# Patient Record
Sex: MALE | Race: WHITE | Employment: OTHER | URBAN - METROPOLITAN AREA
[De-identification: names, ages, dates, MRNs, and addresses within clinical notes are randomized per-mention and may not be internally consistent; named-entity substitution may affect disease eponyms.]

---

## 2022-09-08 LAB
AVERAGE GLUCOSE: ABNORMAL
HBA1C MFR BLD: 6.7 %

## 2022-10-24 NOTE — PROGRESS NOTES
New Patient Abstract    Reason for Referral: Elevated white blood cell count, unspecified    Referring Provider:  TRACEY Morley NP    Primary Care Provider: TRACEY Morley NP    Family History of Cancer/Hematologic Disorders: none noted     Presenting Symptoms: none noted except abnormal CBC    Narrative with recent with Results/Procedures/Biopsies and Dates completed:   Mr. Marlyn Slaughter is a 71-year-old male who reports to be a former smoker for 18 years that quit in 1992. His alcohol use and drug use are unknown. His medical history reports as ANDREW, HTN, GERD, HLD, insomnia, vertigo, CKD, diabetic, diabetic peripheral neuropathy, CAD and nephritis. His surgical history reports as colonoscopy, tonsillectomy, carpal tunnel release, multiple cardiac stent placements and appendectomy. In September 2022, Mr. Luis Glover presented to PCP for follow up on chronic conditions. He reported feeling fine- 'not sick' however his WBC was elevated. He denied fever, chills, dysuria, abdominal pain, nausea or recent infection. He was placed on a Z-pack and to return for repeat CBC and peripheral smear. A referral was place to hematology to further evaluate his leukocytosis. His 9/30/11 CBC reported an elevated WBC of 12.78 with ANC of 9.10 and # eosinophils of 0.69. otherwise WNL. His 9/27/22 CBC reported an elevated WBC of 12.25 and # eosinophil of 0.66 otherwise WNL. His 9/27/22 pathology review of smear reported RBCs as normochromic normocytic; platelets as adequate in morphology and number and WBCs as slight leukocytosis, eosinophilia. His 9/14/22 CBC reported an elevated WBC of 14.54, # eosinophils of 0.69 and ANC of 10.51. His 9/14/22 pathologist review of smear reported mild neutrophilia, consistent with a reactive condition and mild eosinophilia. His 9/8/22 CBC reported an elevated WBC of 13.73, ANC of 9.33 and # eosinophils of 0.64.  His 9/8/22 chemistry panel reported an elevated BUN of 33 and creatinine of 2.4.   Labs      9/14/22 9/8/22      Notes from Referring Provider: n/a    Other Pertinent Information: n/a    Presented at Tumor Board: n/a

## 2022-10-26 ENCOUNTER — OFFICE VISIT (OUTPATIENT)
Dept: ONCOLOGY | Age: 67
End: 2022-10-26
Payer: MEDICARE

## 2022-10-26 ENCOUNTER — HOSPITAL ENCOUNTER (OUTPATIENT)
Dept: LAB | Age: 67
Discharge: HOME | End: 2022-10-29
Payer: MEDICARE

## 2022-10-26 VITALS
BODY MASS INDEX: 34.3 KG/M2 | OXYGEN SATURATION: 96 % | HEART RATE: 69 BPM | RESPIRATION RATE: 18 BRPM | DIASTOLIC BLOOD PRESSURE: 85 MMHG | SYSTOLIC BLOOD PRESSURE: 149 MMHG | HEIGHT: 70 IN | TEMPERATURE: 98.1 F | WEIGHT: 239.6 LBS

## 2022-10-26 DIAGNOSIS — D72.829 LEUKOCYTOSIS, UNSPECIFIED TYPE: ICD-10-CM

## 2022-10-26 DIAGNOSIS — D47.1 MPN (MYELOPROLIFERATIVE NEOPLASM) (HCC): ICD-10-CM

## 2022-10-26 DIAGNOSIS — D47.1 MPN (MYELOPROLIFERATIVE NEOPLASM) (HCC): Primary | ICD-10-CM

## 2022-10-26 LAB
25(OH)D3 SERPL-MCNC: 54.5 NG/ML (ref 30–100)
ALBUMIN SERPL-MCNC: 3.4 G/DL (ref 3.2–4.6)
ALBUMIN/GLOB SERPL: 0.8 {RATIO} (ref 0.4–1.6)
ALP SERPL-CCNC: 106 U/L (ref 50–136)
ALT SERPL-CCNC: 61 U/L (ref 12–65)
ANION GAP SERPL CALC-SCNC: 7 MMOL/L (ref 2–11)
AST SERPL-CCNC: 31 U/L (ref 15–37)
BASOPHILS # BLD: 0.1 K/UL (ref 0–0.2)
BASOPHILS NFR BLD: 1 % (ref 0–2)
BILIRUB SERPL-MCNC: 0.3 MG/DL (ref 0.2–1.1)
BUN SERPL-MCNC: 41 MG/DL (ref 8–23)
CALCIUM SERPL-MCNC: 9.2 MG/DL (ref 8.3–10.4)
CHLORIDE SERPL-SCNC: 105 MMOL/L (ref 101–110)
CO2 SERPL-SCNC: 23 MMOL/L (ref 21–32)
CREAT SERPL-MCNC: 2.1 MG/DL (ref 0.8–1.5)
CRP SERPL-MCNC: 0.7 MG/DL (ref 0–0.9)
DIFFERENTIAL METHOD BLD: NORMAL
EOSINOPHIL # BLD: 0.5 K/UL (ref 0–0.8)
EOSINOPHIL NFR BLD: 5 % (ref 0.5–7.8)
ERYTHROCYTE [DISTWIDTH] IN BLOOD BY AUTOMATED COUNT: 14.6 % (ref 11.9–14.6)
ERYTHROCYTE [SEDIMENTATION RATE] IN BLOOD: 30 MM/HR (ref 0–20)
GLOBULIN SER CALC-MCNC: 4.1 G/DL (ref 2.8–4.5)
GLUCOSE SERPL-MCNC: 339 MG/DL (ref 65–100)
HCT VFR BLD AUTO: 42.3 %
HGB BLD-MCNC: 14.4 G/DL (ref 13.6–17.2)
IMM GRANULOCYTES # BLD AUTO: 0 K/UL (ref 0–0.5)
IMM GRANULOCYTES NFR BLD AUTO: 0 % (ref 0–5)
LYMPHOCYTES # BLD: 2.2 K/UL (ref 0.5–4.6)
LYMPHOCYTES NFR BLD: 21 % (ref 13–44)
Lab: NORMAL
MCH RBC QN AUTO: 28.9 PG (ref 26.1–32.9)
MCHC RBC AUTO-ENTMCNC: 34 G/DL (ref 31.4–35)
MCV RBC AUTO: 84.9 FL (ref 82–102)
MONOCYTES # BLD: 1 K/UL (ref 0.1–1.3)
MONOCYTES NFR BLD: 10 % (ref 4–12)
NEUTS SEG # BLD: 6.8 K/UL (ref 1.7–8.2)
NEUTS SEG NFR BLD: 63 % (ref 43–78)
NRBC # BLD: 0 K/UL (ref 0–0.2)
PLATELET # BLD AUTO: 207 K/UL (ref 150–450)
PMV BLD AUTO: 9.5 FL (ref 9.4–12.3)
POTASSIUM SERPL-SCNC: 4.4 MMOL/L (ref 3.5–5.1)
PROT SERPL-MCNC: 7.5 G/DL (ref 6.3–8.2)
RBC # BLD AUTO: 4.98 M/UL (ref 4.23–5.6)
REFERENCE LAB: NORMAL
REFERENCE LAB: NORMAL
SODIUM SERPL-SCNC: 135 MMOL/L (ref 133–143)
VIT B12 SERPL-MCNC: 1556 PG/ML (ref 193–986)
WBC # BLD AUTO: 10.7 K/UL (ref 4.3–11.1)

## 2022-10-26 PROCEDURE — 82784 ASSAY IGA/IGD/IGG/IGM EACH: CPT

## 2022-10-26 PROCEDURE — 85652 RBC SED RATE AUTOMATED: CPT

## 2022-10-26 PROCEDURE — 4004F PT TOBACCO SCREEN RCVD TLK: CPT | Performed by: INTERNAL MEDICINE

## 2022-10-26 PROCEDURE — 3017F COLORECTAL CA SCREEN DOC REV: CPT | Performed by: INTERNAL MEDICINE

## 2022-10-26 PROCEDURE — G8427 DOCREV CUR MEDS BY ELIG CLIN: HCPCS | Performed by: INTERNAL MEDICINE

## 2022-10-26 PROCEDURE — 36415 COLL VENOUS BLD VENIPUNCTURE: CPT

## 2022-10-26 PROCEDURE — 1123F ACP DISCUSS/DSCN MKR DOCD: CPT | Performed by: INTERNAL MEDICINE

## 2022-10-26 PROCEDURE — 85025 COMPLETE CBC W/AUTO DIFF WBC: CPT

## 2022-10-26 PROCEDURE — 82306 VITAMIN D 25 HYDROXY: CPT

## 2022-10-26 PROCEDURE — 99205 OFFICE O/P NEW HI 60 MIN: CPT | Performed by: INTERNAL MEDICINE

## 2022-10-26 PROCEDURE — 86140 C-REACTIVE PROTEIN: CPT

## 2022-10-26 PROCEDURE — G8484 FLU IMMUNIZE NO ADMIN: HCPCS | Performed by: INTERNAL MEDICINE

## 2022-10-26 PROCEDURE — G8417 CALC BMI ABV UP PARAM F/U: HCPCS | Performed by: INTERNAL MEDICINE

## 2022-10-26 PROCEDURE — 82607 VITAMIN B-12: CPT

## 2022-10-26 RX ORDER — OMEPRAZOLE 40 MG/1
CAPSULE, DELAYED RELEASE ORAL
COMMUNITY
Start: 2022-08-13

## 2022-10-26 RX ORDER — ADHESIVE TAPE 3"X 2.3 YD
400 TAPE, NON-MEDICATED TOPICAL
COMMUNITY

## 2022-10-26 RX ORDER — EMPAGLIFLOZIN 10 MG/1
TABLET, FILM COATED ORAL
COMMUNITY
Start: 2022-08-13

## 2022-10-26 RX ORDER — GABAPENTIN 600 MG/1
600 TABLET ORAL 3 TIMES DAILY
COMMUNITY

## 2022-10-26 RX ORDER — CARVEDILOL 3.12 MG/1
TABLET ORAL
Status: ON HOLD | COMMUNITY
Start: 2022-08-16 | End: 2022-11-19 | Stop reason: HOSPADM

## 2022-10-26 RX ORDER — LISINOPRIL 2.5 MG/1
TABLET ORAL
Status: ON HOLD | COMMUNITY
Start: 2022-07-24 | End: 2022-11-19 | Stop reason: HOSPADM

## 2022-10-26 RX ORDER — TRAZODONE HYDROCHLORIDE 100 MG/1
TABLET ORAL
COMMUNITY
Start: 2022-09-26

## 2022-10-26 RX ORDER — ATORVASTATIN CALCIUM 40 MG/1
TABLET, FILM COATED ORAL
COMMUNITY
Start: 2022-10-03

## 2022-10-26 RX ORDER — CHLORAL HYDRATE 500 MG
1000 CAPSULE ORAL
COMMUNITY

## 2022-10-26 RX ORDER — LANOLIN ALCOHOL/MO/W.PET/CERES
10 CREAM (GRAM) TOPICAL DAILY
COMMUNITY

## 2022-10-26 RX ORDER — ASPIRIN 81 MG/1
81 TABLET ORAL DAILY
COMMUNITY

## 2022-10-26 RX ORDER — NITROGLYCERIN 0.1MG/HR
1 PATCH, TRANSDERMAL 24 HOURS TRANSDERMAL AS NEEDED
Status: ON HOLD | COMMUNITY
End: 2022-11-19 | Stop reason: HOSPADM

## 2022-10-26 RX ORDER — SERTRALINE HYDROCHLORIDE 100 MG/1
100 TABLET, FILM COATED ORAL DAILY
COMMUNITY
Start: 2022-08-16

## 2022-10-26 RX ORDER — UBIDECARENONE 75 MG
200 CAPSULE ORAL DAILY
COMMUNITY

## 2022-10-26 RX ORDER — CLOPIDOGREL BISULFATE 75 MG/1
TABLET ORAL
COMMUNITY
Start: 2022-08-16

## 2022-10-26 NOTE — PATIENT INSTRUCTIONS
Patient Instructions from Today's Visit    Reason for Visit:  New Patient    Diagnosis Information:  [email or web address] net/about-us/asco-answers-patient-education-materials/euzf-iyekzrk-deqf-sheets      Plan: You will have some blood work done today    Follow Up: We will bring you back in November for a follow up with  and lab work prior. Recent Lab Results:  Lab work done today downstairs    Treatment Summary has been discussed and given to patient: n/a        -------------------------------------------------------------------------------------------------------------------  Please call our office at [phone] if you have any  of the following symptoms:   Fever of 100.5 or greater  Chills  Shortness of breath  Swelling or pain in one leg    After office hours an answering service is available and will contact a provider for emergencies or if you are experiencing any of the above symptoms. Patient did not express an interest in My Chart. My Chart log in information explained on the after visit summary printout at the [facility] desk.     Diandra Apodaca MA

## 2022-10-26 NOTE — PROGRESS NOTES
[address]  Phone: [phone]        10/26/2022  Sage Carlson  1955  [id number]      Sage Carlson is a 78year old  man who has been sent to my clinic by Sindy Natarajan NP for evaluation of Leukocytosis. ALLERGIES:     Sulfa drugs. FAMILY HISTORY:    No hematologic disorders. SOCIAL HISTORY:    He is  and lives with his wife. He used to work as . He stopped smoking cigarettes in 5/1992. PAST MEDICAL HISTORY:    Hypertension, Obstructive Sleep Apnea, GERD, Hyperlipidemia, Diabetes Mellitus, Coronary Artery Disease, s/p PTCA, renal insufficiency and Neuropathy. ROS:  The patient complained of fatigue and parasthesia; all other systems negative. PHYSICAL EXAM:   The patient was alert, awake and oriented, no acute distress was noted. Oral examination did not reveal any mucosal lesions. Lymph node examination did not reveal any adenopathy. Neck examination revealed a supple neck, no thyromegaly or masses were noted. Chest examination revealed normal vesicular breath sounds. Heart examination revealed S-1 and S-2 without any murmurs. Abdominal examination revealed a non-tender abdomen, bowel sounds were positive, no organomegaly could be appreciated. Examination of the extremities did not reveal any tenderness or erythema. Examination of the skin did not reveal any lesions. KPS:     90. LABORATORY INVESTIGATIONS:  CBC showed a WBC count of 10.7, ANC was 6.8, Hemoglobin was 14.4 and Platelets were 581; his ESR was elevated. Medical problems and test results were reviewed with the patient today. ASSESSMENT:    MPN; Leukocytosis. I spent a total of 62 minutes on the day of the visit, managing the care of this patient.       PLAN:     I have sent blood work for an extensive work-up including FISH for bcr/abl and a Myeloid Disorders Profile; at his next clinic visit I will re-check his CBC and CMP. Thank you for allowing me to participate in the care of this patient.       Jorge A Colon MD  Hematology/BMT

## 2022-10-28 LAB
ALBUMIN SERPL ELPH-MCNC: 3.7 G/DL (ref 2.9–4.4)
ALBUMIN/GLOB SERPL: 1.2 {RATIO} (ref 0.7–1.7)
ALPHA1 GLOB SERPL ELPH-MCNC: 0.2 G/DL (ref 0–0.4)
ALPHA2 GLOB SERPL ELPH-MCNC: 0.9 G/DL (ref 0.4–1)
B-GLOBULIN SERPL ELPH-MCNC: 1 G/DL (ref 0.7–1.3)
GAMMA GLOB SERPL ELPH-MCNC: 1 G/DL (ref 0.4–1.8)
GLOBULIN SER-MCNC: 3.2 G/DL (ref 2.2–3.9)
IGA SERPL-MCNC: 338 MG/DL (ref 61–437)
IGG SERPL-MCNC: 1045 MG/DL (ref 603–1613)
IGM SERPL-MCNC: 95 MG/DL (ref 20–172)
INTERPRETATION SERPL IEP-IMP: NORMAL
M PROTEIN SERPL ELPH-MCNC: NORMAL G/DL
PROT SERPL-MCNC: 6.9 G/DL (ref 6–8.5)

## 2022-11-15 DIAGNOSIS — D47.1 MPN (MYELOPROLIFERATIVE NEOPLASM) (HCC): Primary | ICD-10-CM

## 2022-11-16 ENCOUNTER — HOSPITAL ENCOUNTER (OUTPATIENT)
Dept: LAB | Age: 67
Discharge: HOME | End: 2022-11-19
Payer: MEDICARE

## 2022-11-16 ENCOUNTER — OFFICE VISIT (OUTPATIENT)
Dept: ONCOLOGY | Age: 67
End: 2022-11-16
Payer: MEDICARE

## 2022-11-16 VITALS
DIASTOLIC BLOOD PRESSURE: 70 MMHG | HEART RATE: 65 BPM | WEIGHT: 243.9 LBS | RESPIRATION RATE: 20 BRPM | HEIGHT: 70 IN | TEMPERATURE: 97.8 F | BODY MASS INDEX: 34.92 KG/M2 | SYSTOLIC BLOOD PRESSURE: 123 MMHG | OXYGEN SATURATION: 98 %

## 2022-11-16 DIAGNOSIS — E78.49 OTHER HYPERLIPIDEMIA: ICD-10-CM

## 2022-11-16 DIAGNOSIS — K21.9 CHRONIC GERD: ICD-10-CM

## 2022-11-16 DIAGNOSIS — D72.829 LEUKOCYTOSIS, UNSPECIFIED TYPE: Primary | ICD-10-CM

## 2022-11-16 DIAGNOSIS — D47.1 MPN (MYELOPROLIFERATIVE NEOPLASM) (HCC): ICD-10-CM

## 2022-11-16 LAB
ALBUMIN SERPL-MCNC: 3.6 G/DL (ref 3.2–4.6)
ALBUMIN/GLOB SERPL: 0.9 {RATIO} (ref 0.4–1.6)
ALP SERPL-CCNC: 116 U/L (ref 50–136)
ALT SERPL-CCNC: 48 U/L (ref 12–65)
ANION GAP SERPL CALC-SCNC: 6 MMOL/L (ref 2–11)
AST SERPL-CCNC: 24 U/L (ref 15–37)
BASOPHILS # BLD: 0.2 K/UL (ref 0–0.2)
BASOPHILS NFR BLD: 2 % (ref 0–2)
BILIRUB SERPL-MCNC: 0.4 MG/DL (ref 0.2–1.1)
BUN SERPL-MCNC: 34 MG/DL (ref 8–23)
CALCIUM SERPL-MCNC: 8.9 MG/DL (ref 8.3–10.4)
CHLORIDE SERPL-SCNC: 101 MMOL/L (ref 101–110)
CO2 SERPL-SCNC: 26 MMOL/L (ref 21–32)
CREAT SERPL-MCNC: 2.4 MG/DL (ref 0.8–1.5)
DIFFERENTIAL METHOD BLD: NORMAL
EOSINOPHIL # BLD: 0.5 K/UL (ref 0–0.8)
EOSINOPHIL NFR BLD: 5 % (ref 0.5–7.8)
ERYTHROCYTE [DISTWIDTH] IN BLOOD BY AUTOMATED COUNT: 14.4 % (ref 11.9–14.6)
GLOBULIN SER CALC-MCNC: 3.8 G/DL (ref 2.8–4.5)
GLUCOSE SERPL-MCNC: 445 MG/DL (ref 65–100)
HCT VFR BLD AUTO: 43.9 %
HGB BLD-MCNC: 14.4 G/DL (ref 13.6–17.2)
IMM GRANULOCYTES # BLD AUTO: 0 K/UL (ref 0–0.5)
IMM GRANULOCYTES NFR BLD AUTO: 0 % (ref 0–5)
LYMPHOCYTES # BLD: 2.3 K/UL (ref 0.5–4.6)
LYMPHOCYTES NFR BLD: 22 % (ref 13–44)
MCH RBC QN AUTO: 28.5 PG (ref 26.1–32.9)
MCHC RBC AUTO-ENTMCNC: 32.8 G/DL (ref 31.4–35)
MCV RBC AUTO: 86.8 FL (ref 82–102)
MONOCYTES # BLD: 0.9 K/UL (ref 0.1–1.3)
MONOCYTES NFR BLD: 9 % (ref 4–12)
NEUTS SEG # BLD: 6.3 K/UL (ref 1.7–8.2)
NEUTS SEG NFR BLD: 61 % (ref 43–78)
NRBC # BLD: 0 K/UL (ref 0–0.2)
PLATELET # BLD AUTO: 202 K/UL (ref 150–450)
PMV BLD AUTO: 9.4 FL (ref 9.4–12.3)
POTASSIUM SERPL-SCNC: 4.8 MMOL/L (ref 3.5–5.1)
PROT SERPL-MCNC: 7.4 G/DL (ref 6.3–8.2)
RBC # BLD AUTO: 5.06 M/UL (ref 4.23–5.6)
SODIUM SERPL-SCNC: 133 MMOL/L (ref 133–143)
WBC # BLD AUTO: 10.3 K/UL (ref 4.3–11.1)

## 2022-11-16 PROCEDURE — 99214 OFFICE O/P EST MOD 30 MIN: CPT | Performed by: INTERNAL MEDICINE

## 2022-11-16 PROCEDURE — 1123F ACP DISCUSS/DSCN MKR DOCD: CPT | Performed by: INTERNAL MEDICINE

## 2022-11-16 PROCEDURE — G8484 FLU IMMUNIZE NO ADMIN: HCPCS | Performed by: INTERNAL MEDICINE

## 2022-11-16 PROCEDURE — 85025 COMPLETE CBC W/AUTO DIFF WBC: CPT

## 2022-11-16 PROCEDURE — 80053 COMPREHEN METABOLIC PANEL: CPT

## 2022-11-16 PROCEDURE — 36415 COLL VENOUS BLD VENIPUNCTURE: CPT

## 2022-11-16 PROCEDURE — 3017F COLORECTAL CA SCREEN DOC REV: CPT | Performed by: INTERNAL MEDICINE

## 2022-11-16 PROCEDURE — 1036F TOBACCO NON-USER: CPT | Performed by: INTERNAL MEDICINE

## 2022-11-16 PROCEDURE — G8427 DOCREV CUR MEDS BY ELIG CLIN: HCPCS | Performed by: INTERNAL MEDICINE

## 2022-11-16 PROCEDURE — G8417 CALC BMI ABV UP PARAM F/U: HCPCS | Performed by: INTERNAL MEDICINE

## 2022-11-16 ASSESSMENT — PATIENT HEALTH QUESTIONNAIRE - PHQ9
SUM OF ALL RESPONSES TO PHQ QUESTIONS 1-9: 0
SUM OF ALL RESPONSES TO PHQ QUESTIONS 1-9: 0
SUM OF ALL RESPONSES TO PHQ9 QUESTIONS 1 & 2: 0
1. LITTLE INTEREST OR PLEASURE IN DOING THINGS: 0
2. FEELING DOWN, DEPRESSED OR HOPELESS: 0
SUM OF ALL RESPONSES TO PHQ QUESTIONS 1-9: 0
SUM OF ALL RESPONSES TO PHQ QUESTIONS 1-9: 0

## 2022-11-16 NOTE — PATIENT INSTRUCTIONS
Patient Instructions from Today's Visit    Reason for Visit:      Diagnosis Information:  [email or web address] net/about-us/asco-answers-patient-education-materials/gjzr-gsywpsz-jtvy-sheets    Plan:  Lab work looks stable today  Your White Count has come back to normal    -we think this was elevated before because of inflammation in the body  We did not find any evidence of Leukemia or a bone marrow cancer    Follow Up: We would like you to continue seeing your PCP.  If you need anything in the future, please call our office    Recent Lab Results:  Hospital Outpatient Visit on 11/16/2022   Component Date Value Ref Range Status    WBC 11/16/2022 10.3  4.3 - 11.1 K/uL Final    RBC 11/16/2022 5.06  4.23 - 5.6 M/uL Final    Hemoglobin 11/16/2022 14.4  13.6 - 17.2 g/dL Final    Hematocrit 11/16/2022 43.9  % Final    MCV 11/16/2022 86.8  82.0 - 102.0 FL Final    MCH 11/16/2022 28.5  26.1 - 32.9 PG Final    MCHC 11/16/2022 32.8  31.4 - 35.0 g/dL Final    RDW 11/16/2022 14.4  11.9 - 14.6 % Final    Platelets 38/21/9729 202  150 - 450 K/uL Final    MPV 11/16/2022 9.4  9.4 - 12.3 FL Final    nRBC 11/16/2022 0.00  0.0 - 0.2 K/uL Final    **Note: Absolute NRBC parameter is now reported with Hemogram**    Differential Type 11/16/2022 AUTOMATED    Final    Seg Neutrophils 11/16/2022 61  43 - 78 % Final    Lymphocytes 11/16/2022 22  13 - 44 % Final    Monocytes 11/16/2022 9  4.0 - 12.0 % Final    Eosinophils % 11/16/2022 5  0.5 - 7.8 % Final    Basophils 11/16/2022 2  0.0 - 2.0 % Final    Immature Granulocytes 11/16/2022 0  0.0 - 5.0 % Final    Segs Absolute 11/16/2022 6.3  1.7 - 8.2 K/UL Final    Absolute Lymph # 11/16/2022 2.3  0.5 - 4.6 K/UL Final    Absolute Mono # 11/16/2022 0.9  0.1 - 1.3 K/UL Final    Absolute Eos # 11/16/2022 0.5  0.0 - 0.8 K/UL Final    Basophils Absolute 11/16/2022 0.2  0.0 - 0.2 K/UL Final    Absolute Immature Granulocyte 11/16/2022 0.0  0.0 - 0.5 K/UL Final    Sodium 11/16/2022 133  133 - 143 mmol/L Final    Potassium 11/16/2022 4.8  3.5 - 5.1 mmol/L Final    Chloride 11/16/2022 101  101 - 110 mmol/L Final    CO2 11/16/2022 26  21 - 32 mmol/L Final    Anion Gap 11/16/2022 6  2 - 11 mmol/L Final    Glucose 11/16/2022 445 (A)  65 - 100 mg/dL Final    BUN 11/16/2022 34 (A)  8 - 23 MG/DL Final    Creatinine 11/16/2022 2.40 (A)  0.8 - 1.5 MG/DL Final    Est, Glom Filt Rate 11/16/2022 29 (A)  >60 ml/min/1.73m2 Final    Comment:      Pediatric calculator link: [employer].at. org/professionals/kdoqi/gfr_calculatorped       Effective Oct 3, 2022       These results are not intended for use in patients <25years of age. eGFR results are calculated without a race factor using  the 2021 CKD-EPI equation. Careful clinical correlation is recommended, particularly when comparing to results calculated using previous equations. The CKD-EPI equation is less accurate in patients with extremes of muscle mass, extra-renal metabolism of creatinine, excessive creatine ingestion, or following therapy that affects renal tubular secretion.       Calcium 11/16/2022 8.9  8.3 - 10.4 MG/DL Final    Total Bilirubin 11/16/2022 0.4  0.2 - 1.1 MG/DL Final    ALT 11/16/2022 48  12 - 65 U/L Final    AST 11/16/2022 24  15 - 37 U/L Final    Alk Phosphatase 11/16/2022 116  50 - 136 U/L Final    Total Protein 11/16/2022 7.4  6.3 - 8.2 g/dL Final    Albumin 11/16/2022 3.6  3.2 - 4.6 g/dL Final    Globulin 11/16/2022 3.8  2.8 - 4.5 g/dL Final    Albumin/Globulin Ratio 11/16/2022 0.9  0.4 - 1.6   Final         Treatment Summary has been discussed and given to patient: n/a        -------------------------------------------------------------------------------------------------------------------  Please call our office at [phone] if you have any  of the following symptoms:   Fever of 100.5 or greater  Chills  Shortness of breath  Swelling or pain in one leg    After office hours an answering service is available and will contact a provider for emergencies or if you are experiencing any of the above symptoms. Patient has My Chart. My Chart log in information explained on the after visit summary printout at the . [facility] desk.     Diego Aleman MA

## 2022-11-16 NOTE — PROGRESS NOTES
[address]  Phone: [phone]           11/16/2022  Maria L Michel  1955  [id number]        Maria L Michel is a 78year old  man who has returned to my clinic for a follow-up visit; he was initially referred to me by Chuckie Nieves NP for evaluation of Leukocytosis; his FISH for bcr/abl and Myeloid Disorders Profile were unremarkable, his ESR was elevated. ALLERGIES:     Sulfa drugs. FAMILY HISTORY:    No hematologic disorders. SOCIAL HISTORY:    He is  and lives with his wife. He used to work as . He stopped smoking cigarettes in 5/1992. PAST MEDICAL HISTORY:    Hypertension, Obstructive Sleep Apnea, GERD, Hyperlipidemia, Diabetes Mellitus, Coronary Artery Disease, s/p PTCA, renal insufficiency and Neuropathy. ROS:  The patient complained of fatigue and parasthesia; all other systems negative. PHYSICAL EXAM:   The patient was alert, awake and oriented, no acute distress was noted. Oral examination did not reveal any mucosal lesions. Lymph node examination did not reveal any adenopathy. Neck examination revealed a supple neck, no thyromegaly or masses were noted. Chest examination revealed normal vesicular breath sounds. Heart examination revealed S-1 and S-2 without any murmurs. Abdominal examination revealed a non-tender abdomen, bowel sounds were positive, no organomegaly could be appreciated. Examination of the extremities did not reveal any tenderness or erythema. Examination of the skin did not reveal any lesions. KPS:     90. LABORATORY INVESTIGATIONS:  CBC showed a WBC count of 10.3, ANC was 6.3, Hemoglobin was 14.4 and Platelets were 122; his ESR was elevated. Medical problems and test results were reviewed with the patient today. ASSESSMENT:     Leukocytosis; chronic GERD; Hyperlipidemia.         PLAN:     He should continue taking Atorvastatin and Omeprazole; no further Hematologic intervention is required at this time.         Alin Street MD  Hematology/BMT

## 2022-11-17 ENCOUNTER — APPOINTMENT (OUTPATIENT)
Dept: GENERAL RADIOLOGY | Age: 67
End: 2022-11-17
Payer: MEDICARE

## 2022-11-17 ENCOUNTER — HOSPITAL ENCOUNTER (OUTPATIENT)
Age: 67
Setting detail: OBSERVATION
Discharge: HOME | End: 2022-11-19
Attending: EMERGENCY MEDICINE | Admitting: INTERNAL MEDICINE
Payer: MEDICARE

## 2022-11-17 DIAGNOSIS — N28.9 RENAL INSUFFICIENCY: ICD-10-CM

## 2022-11-17 DIAGNOSIS — R07.9 CHEST PAIN: ICD-10-CM

## 2022-11-17 DIAGNOSIS — I20.0 UNSTABLE ANGINA (HCC): ICD-10-CM

## 2022-11-17 DIAGNOSIS — Z09 HOSPITAL DISCHARGE FOLLOW-UP: Primary | ICD-10-CM

## 2022-11-17 PROBLEM — N18.32 STAGE 3B CHRONIC KIDNEY DISEASE (HCC): Status: ACTIVE | Noted: 2019-06-05

## 2022-11-17 PROBLEM — E11.9 TYPE II DIABETES MELLITUS, WELL CONTROLLED (HCC): Status: ACTIVE | Noted: 2019-06-05

## 2022-11-17 PROBLEM — G47.33 OBSTRUCTIVE SLEEP APNEA SYNDROME: Status: ACTIVE | Noted: 2021-07-28

## 2022-11-17 PROBLEM — I25.10 CORONARY ARTERIOSCLEROSIS: Status: ACTIVE | Noted: 2019-06-05

## 2022-11-17 PROBLEM — E11.42 DIABETIC PERIPHERAL NEUROPATHY (HCC): Status: ACTIVE | Noted: 2022-09-08

## 2022-11-17 PROBLEM — I12.9 HYPERTENSIVE RENAL DISEASE: Status: ACTIVE | Noted: 2019-06-05

## 2022-11-17 PROBLEM — K37 APPENDICITIS: Status: ACTIVE | Noted: 2021-07-28

## 2022-11-17 PROBLEM — M10.9 GOUT: Status: ACTIVE | Noted: 2019-06-05

## 2022-11-17 PROBLEM — N05.9 NEPHRITIS: Status: ACTIVE | Noted: 2022-11-10

## 2022-11-17 PROBLEM — I10 BENIGN ESSENTIAL HYPERTENSION: Status: ACTIVE | Noted: 2019-06-05

## 2022-11-17 PROBLEM — E87.5 HYPERKALEMIA: Status: ACTIVE | Noted: 2021-11-21

## 2022-11-17 PROBLEM — E78.2 MIXED HYPERLIPIDEMIA: Status: ACTIVE | Noted: 2022-03-10

## 2022-11-17 PROBLEM — K21.9 GASTROESOPHAGEAL REFLUX DISEASE WITHOUT ESOPHAGITIS: Status: ACTIVE | Noted: 2022-09-08

## 2022-11-17 PROBLEM — N25.81 SECONDARY HYPERPARATHYROIDISM (HCC): Status: ACTIVE | Noted: 2019-06-05

## 2022-11-17 PROBLEM — I73.9 PERIPHERAL VASCULAR DISEASE (HCC): Status: ACTIVE | Noted: 2019-06-05

## 2022-11-17 PROBLEM — F51.01 PRIMARY INSOMNIA: Status: ACTIVE | Noted: 2022-09-08

## 2022-11-17 LAB
ALBUMIN SERPL-MCNC: 1.9 G/DL (ref 3.2–4.6)
ALBUMIN/GLOB SERPL: 0.4 {RATIO} (ref 0.4–1.6)
ALP SERPL-CCNC: 130 U/L (ref 50–136)
ALT SERPL-CCNC: 51 U/L (ref 12–65)
ANION GAP SERPL CALC-SCNC: 6 MMOL/L (ref 2–11)
AST SERPL-CCNC: 47 U/L (ref 15–37)
BILIRUB SERPL-MCNC: 0.4 MG/DL (ref 0.2–1.1)
BUN SERPL-MCNC: 51 MG/DL (ref 8–23)
CALCIUM SERPL-MCNC: 9.5 MG/DL (ref 8.3–10.4)
CHLORIDE SERPL-SCNC: 106 MMOL/L (ref 101–110)
CO2 SERPL-SCNC: 25 MMOL/L (ref 21–32)
CREAT SERPL-MCNC: 2.2 MG/DL (ref 0.8–1.5)
EKG ATRIAL RATE: 70 BPM
EKG DIAGNOSIS: NORMAL
EKG P AXIS: 45 DEGREES
EKG P-R INTERVAL: 174 MS
EKG Q-T INTERVAL: 412 MS
EKG QRS DURATION: 148 MS
EKG QTC CALCULATION (BAZETT): 444 MS
EKG R AXIS: -46 DEGREES
EKG T AXIS: 73 DEGREES
EKG VENTRICULAR RATE: 70 BPM
ERYTHROCYTE [DISTWIDTH] IN BLOOD BY AUTOMATED COUNT: 13.9 % (ref 11.9–14.6)
GLOBULIN SER CALC-MCNC: 5.2 G/DL (ref 2.8–4.5)
GLUCOSE SERPL-MCNC: 304 MG/DL (ref 65–100)
HCT VFR BLD AUTO: 44.2 % (ref 41.1–50.3)
HGB BLD-MCNC: 14.6 G/DL (ref 13.6–17.2)
LIPASE SERPL-CCNC: 224 U/L (ref 73–393)
MAGNESIUM SERPL-MCNC: 2.3 MG/DL (ref 1.8–2.4)
MCH RBC QN AUTO: 28.6 PG (ref 26.1–32.9)
MCHC RBC AUTO-ENTMCNC: 33 G/DL (ref 31.4–35)
MCV RBC AUTO: 86.7 FL (ref 82–102)
NRBC # BLD: 0 K/UL (ref 0–0.2)
PLATELET # BLD AUTO: 211 K/UL (ref 150–450)
PMV BLD AUTO: 9.6 FL (ref 9.4–12.3)
POTASSIUM SERPL-SCNC: 5.7 MMOL/L (ref 3.5–5.1)
PROT SERPL-MCNC: 7.1 G/DL (ref 6.3–8.2)
RBC # BLD AUTO: 5.1 M/UL (ref 4.23–5.6)
SODIUM SERPL-SCNC: 137 MMOL/L (ref 133–143)
TROPONIN I SERPL HS-MCNC: 8.5 PG/ML (ref 0–14)
TROPONIN I SERPL HS-MCNC: 9 PG/ML (ref 0–14)
WBC # BLD AUTO: 10.7 K/UL (ref 4.3–11.1)

## 2022-11-17 PROCEDURE — 71045 X-RAY EXAM CHEST 1 VIEW: CPT

## 2022-11-17 PROCEDURE — 6370000000 HC RX 637 (ALT 250 FOR IP): Performed by: NURSE PRACTITIONER

## 2022-11-17 PROCEDURE — 99220 PR INITIAL OBSERVATION CARE/DAY 70 MINUTES: CPT | Performed by: INTERNAL MEDICINE

## 2022-11-17 PROCEDURE — G0378 HOSPITAL OBSERVATION PER HR: HCPCS

## 2022-11-17 PROCEDURE — 83690 ASSAY OF LIPASE: CPT

## 2022-11-17 PROCEDURE — 85027 COMPLETE CBC AUTOMATED: CPT

## 2022-11-17 PROCEDURE — 51702 INSERT TEMP BLADDER CATH: CPT

## 2022-11-17 PROCEDURE — 83735 ASSAY OF MAGNESIUM: CPT

## 2022-11-17 PROCEDURE — 94761 N-INVAS EAR/PLS OXIMETRY MLT: CPT

## 2022-11-17 PROCEDURE — 93005 ELECTROCARDIOGRAM TRACING: CPT | Performed by: EMERGENCY MEDICINE

## 2022-11-17 PROCEDURE — 80053 COMPREHEN METABOLIC PANEL: CPT

## 2022-11-17 PROCEDURE — 99285 EMERGENCY DEPT VISIT HI MDM: CPT

## 2022-11-17 PROCEDURE — 84484 ASSAY OF TROPONIN QUANT: CPT

## 2022-11-17 RX ORDER — ADHESIVE TAPE 3"X 2.3 YD
400 TAPE, NON-MEDICATED TOPICAL DAILY
Status: DISCONTINUED | OUTPATIENT
Start: 2022-11-17 | End: 2022-11-17 | Stop reason: SDUPTHER

## 2022-11-17 RX ORDER — PANTOPRAZOLE SODIUM 40 MG/1
40 TABLET, DELAYED RELEASE ORAL
Status: DISCONTINUED | OUTPATIENT
Start: 2022-11-18 | End: 2022-11-19 | Stop reason: HOSPADM

## 2022-11-17 RX ORDER — ONDANSETRON 4 MG/1
4 TABLET, ORALLY DISINTEGRATING ORAL EVERY 8 HOURS PRN
Status: DISCONTINUED | OUTPATIENT
Start: 2022-11-17 | End: 2022-11-19 | Stop reason: HOSPADM

## 2022-11-17 RX ORDER — NITROGLYCERIN 0.4 MG/1
0.4 TABLET SUBLINGUAL
Status: DISCONTINUED | OUTPATIENT
Start: 2022-11-17 | End: 2022-11-17

## 2022-11-17 RX ORDER — POTASSIUM CHLORIDE 20 MEQ/1
40 TABLET, EXTENDED RELEASE ORAL PRN
Status: DISCONTINUED | OUTPATIENT
Start: 2022-11-17 | End: 2022-11-19 | Stop reason: HOSPADM

## 2022-11-17 RX ORDER — CETIRIZINE HYDROCHLORIDE 10 MG/1
10 TABLET ORAL DAILY
Status: DISCONTINUED | OUTPATIENT
Start: 2022-11-18 | End: 2022-11-19 | Stop reason: HOSPADM

## 2022-11-17 RX ORDER — CARVEDILOL 3.12 MG/1
3.12 TABLET ORAL 2 TIMES DAILY
Status: DISCONTINUED | OUTPATIENT
Start: 2022-11-17 | End: 2022-11-19

## 2022-11-17 RX ORDER — ONDANSETRON 2 MG/ML
4 INJECTION INTRAMUSCULAR; INTRAVENOUS EVERY 6 HOURS PRN
Status: DISCONTINUED | OUTPATIENT
Start: 2022-11-17 | End: 2022-11-19 | Stop reason: HOSPADM

## 2022-11-17 RX ORDER — MAGNESIUM SULFATE IN WATER 40 MG/ML
2000 INJECTION, SOLUTION INTRAVENOUS PRN
Status: DISCONTINUED | OUTPATIENT
Start: 2022-11-17 | End: 2022-11-19 | Stop reason: HOSPADM

## 2022-11-17 RX ORDER — ACETAMINOPHEN 650 MG/1
650 SUPPOSITORY RECTAL EVERY 6 HOURS PRN
Status: DISCONTINUED | OUTPATIENT
Start: 2022-11-17 | End: 2022-11-19 | Stop reason: HOSPADM

## 2022-11-17 RX ORDER — NITROGLYCERIN 0.4 MG/1
0.4 TABLET SUBLINGUAL EVERY 5 MIN PRN
COMMUNITY
Start: 2022-10-27

## 2022-11-17 RX ORDER — CETIRIZINE HYDROCHLORIDE 10 MG/1
10 TABLET ORAL DAILY
COMMUNITY

## 2022-11-17 RX ORDER — CLOPIDOGREL BISULFATE 75 MG/1
75 TABLET ORAL DAILY
Status: DISCONTINUED | OUTPATIENT
Start: 2022-11-18 | End: 2022-11-19 | Stop reason: HOSPADM

## 2022-11-17 RX ORDER — SODIUM CHLORIDE 0.9 % (FLUSH) 0.9 %
5-40 SYRINGE (ML) INJECTION EVERY 12 HOURS SCHEDULED
Status: DISCONTINUED | OUTPATIENT
Start: 2022-11-17 | End: 2022-11-19 | Stop reason: HOSPADM

## 2022-11-17 RX ORDER — UBIDECARENONE 75 MG
200 CAPSULE ORAL DAILY
Status: DISCONTINUED | OUTPATIENT
Start: 2022-11-17 | End: 2022-11-17 | Stop reason: SDUPTHER

## 2022-11-17 RX ORDER — ACETAMINOPHEN 325 MG/1
650 TABLET ORAL EVERY 6 HOURS PRN
Status: DISCONTINUED | OUTPATIENT
Start: 2022-11-17 | End: 2022-11-19 | Stop reason: HOSPADM

## 2022-11-17 RX ORDER — SODIUM CHLORIDE 9 MG/ML
INJECTION, SOLUTION INTRAVENOUS CONTINUOUS
Status: DISCONTINUED | OUTPATIENT
Start: 2022-11-17 | End: 2022-11-19 | Stop reason: HOSPADM

## 2022-11-17 RX ORDER — POTASSIUM CHLORIDE 7.45 MG/ML
10 INJECTION INTRAVENOUS PRN
Status: DISCONTINUED | OUTPATIENT
Start: 2022-11-17 | End: 2022-11-19 | Stop reason: HOSPADM

## 2022-11-17 RX ORDER — GABAPENTIN 300 MG/1
600 CAPSULE ORAL 3 TIMES DAILY
Status: DISCONTINUED | OUTPATIENT
Start: 2022-11-17 | End: 2022-11-19 | Stop reason: HOSPADM

## 2022-11-17 RX ORDER — ATORVASTATIN CALCIUM 40 MG/1
40 TABLET, FILM COATED ORAL NIGHTLY
Status: DISCONTINUED | OUTPATIENT
Start: 2022-11-17 | End: 2022-11-19 | Stop reason: HOSPADM

## 2022-11-17 RX ORDER — ASPIRIN 81 MG/1
81 TABLET ORAL DAILY
Status: DISCONTINUED | OUTPATIENT
Start: 2022-11-18 | End: 2022-11-19 | Stop reason: HOSPADM

## 2022-11-17 RX ORDER — TRAZODONE HYDROCHLORIDE 50 MG/1
150 TABLET ORAL NIGHTLY PRN
Status: DISCONTINUED | OUTPATIENT
Start: 2022-11-17 | End: 2022-11-19 | Stop reason: HOSPADM

## 2022-11-17 RX ORDER — INSULIN LISPRO 100 [IU]/ML
0-8 INJECTION, SOLUTION INTRAVENOUS; SUBCUTANEOUS
Status: DISCONTINUED | OUTPATIENT
Start: 2022-11-17 | End: 2022-11-19 | Stop reason: HOSPADM

## 2022-11-17 RX ORDER — NITROGLYCERIN 0.4 MG/1
0.4 TABLET SUBLINGUAL EVERY 5 MIN PRN
Status: DISCONTINUED | OUTPATIENT
Start: 2022-11-17 | End: 2022-11-19 | Stop reason: HOSPADM

## 2022-11-17 RX ORDER — ROPINIROLE 2 MG/1
2 TABLET, FILM COATED ORAL DAILY
Status: DISCONTINUED | OUTPATIENT
Start: 2022-11-18 | End: 2022-11-19 | Stop reason: HOSPADM

## 2022-11-17 RX ORDER — SODIUM CHLORIDE 0.9 % (FLUSH) 0.9 %
5-40 SYRINGE (ML) INJECTION PRN
Status: DISCONTINUED | OUTPATIENT
Start: 2022-11-17 | End: 2022-11-19 | Stop reason: HOSPADM

## 2022-11-17 RX ORDER — LANOLIN ALCOHOL/MO/W.PET/CERES
10 CREAM (GRAM) TOPICAL DAILY
Status: DISCONTINUED | OUTPATIENT
Start: 2022-11-17 | End: 2022-11-19 | Stop reason: HOSPADM

## 2022-11-17 RX ORDER — POLYETHYLENE GLYCOL 3350 17 G/17G
17 POWDER, FOR SOLUTION ORAL DAILY PRN
Status: DISCONTINUED | OUTPATIENT
Start: 2022-11-17 | End: 2022-11-19 | Stop reason: HOSPADM

## 2022-11-17 RX ORDER — ROPINIROLE 2 MG/1
2 TABLET, FILM COATED ORAL DAILY
COMMUNITY
Start: 2022-10-27

## 2022-11-17 RX ORDER — INSULIN LISPRO 100 [IU]/ML
0-4 INJECTION, SOLUTION INTRAVENOUS; SUBCUTANEOUS NIGHTLY
Status: DISCONTINUED | OUTPATIENT
Start: 2022-11-17 | End: 2022-11-19 | Stop reason: HOSPADM

## 2022-11-17 RX ADMIN — Medication 10.5 MG: at 20:18

## 2022-11-17 RX ADMIN — TRAZODONE HYDROCHLORIDE 150 MG: 50 TABLET ORAL at 20:17

## 2022-11-17 RX ADMIN — ATORVASTATIN CALCIUM 40 MG: 40 TABLET, FILM COATED ORAL at 20:18

## 2022-11-17 RX ADMIN — CARVEDILOL 3.12 MG: 3.12 TABLET, FILM COATED ORAL at 20:17

## 2022-11-17 RX ADMIN — GABAPENTIN 600 MG: 300 CAPSULE ORAL at 20:17

## 2022-11-17 ASSESSMENT — ENCOUNTER SYMPTOMS
SHORTNESS OF BREATH: 1
ABDOMINAL PAIN: 0
NAUSEA: 0
COUGH: 0
VOMITING: 0
BACK PAIN: 0
SORE THROAT: 0

## 2022-11-17 ASSESSMENT — PAIN - FUNCTIONAL ASSESSMENT: PAIN_FUNCTIONAL_ASSESSMENT: 0-10

## 2022-11-17 ASSESSMENT — PAIN SCALES - GENERAL: PAINLEVEL_OUTOF10: 0

## 2022-11-17 NOTE — ED TRIAGE NOTES
Patient arrives via EMS from home. Reports CP x 2 weeks, that has been relieved with NTG. Today NTG did not relieve pain Reports heaviness radiating down arm. Hx stents. 2 SL NTG given en route.  324ASA PTA

## 2022-11-17 NOTE — ED NOTES
Pt requested to be cathed due to self cathing at home. [facility] by Annette Alex. MD notified.      Autumn Alejandro RN  11/17/22 [id number]

## 2022-11-17 NOTE — PROGRESS NOTES
[facility] CARDIOLOGY  [address], [address]  PHONE: [phone]         CONSULT        22      NAME:  Glenys Wilson  : 1955  MRN: [id number]      SUBJECTIVE:   Glenys Wilson is a 78 y.o. male seen for a consultation visit regarding the following:     Chief Complaint   Patient presents with    Chest Pain            HPI:    79 YO MALE 3 WEEKS INCREASING RSCP BETTER WITH NTG. 10-30 MINUTES. NO ASSOCIATED SYMPTOMS. NO AGGRAVATING FACTORS. Allergies   Allergen Reactions    Sulfa Antibiotics Swelling    Pramipexole Palpitations     No past medical history on file. CAD PCI 5 YR AGO  No past surgical history on file. No family history on file. NO PREMATURE CAD  Social History     Tobacco Use    Smoking status: Never    Smokeless tobacco: Never   Substance Use Topics    Alcohol use: Not on file           ROS:    Constitution: Negative for fever. Eyes: Negative for blurred vision. Respiratory: Negative for cough. Endocrine: Negative for cold intolerance and heat intolerance. Skin: Negative for rash. Musculoskeletal: Negative for myalgias. Gastrointestinal: Negative for diarrhea, nausea and vomiting. Genitourinary: Negative for dysuria. Neurological: Negative for headaches and numbness. PHYSICAL EXAM:     BP (!) 154/79   Pulse 66   Temp 98.6 °F (37 °C)   Resp 14   SpO2 94%    Constitutional: Oriented to person, place, and time. Appears well-developed and well-nourished. Head: Normocephalic and atraumatic. Neck: Neck supple. Cardiovascular: Normal rate and regular rhythm with no murmur -No JVP  Pulmonary/Chest: Breath sounds normal.   Abdominal: Soft. Musculoskeletal: No edema. Neurological: Alert and oriented to person, place, and time. Skin: Skin is warm and dry. Psychiatric: Normal mood and affect. Vitals reviewed        Medical problems and test results were reviewed with the patient today.      Wt Readings from Last 3 Encounters:   11/16/22 243 lb 14.4 oz (110.6 kg)   10/26/22 239 lb 9.6 oz (108.7 kg)          Recent Results (from the past 672 hour(s))   Sedimentation Rate    Collection Time: 10/26/22  1:33 PM   Result Value Ref Range    Sed Rate, Automated 30 (H) 0 - 20 mm/hr   C-Reactive Protein    Collection Time: 10/26/22  1:33 PM   Result Value Ref Range    CRP 0.7 0.0 - 0.9 mg/dL   Vitamin B12    Collection Time: 10/26/22  1:33 PM   Result Value Ref Range    Vitamin B-12 1556 (H) 193 - 986 pg/mL   Vitamin D 25 Hydroxy    Collection Time: 10/26/22  1:33 PM   Result Value Ref Range    Vit D, 25-Hydroxy 54.5 30.0 - 100.0 ng/mL   DENY and PE, Serum    Collection Time: 10/26/22  1:33 PM   Result Value Ref Range    IgG, Serum 1,045 603 - 1,613 mg/dL    IgA 338 61 - 437 mg/dL    IgM 95 20 - 172 mg/dL    Total Protein 6.9 6.0 - 8.5 g/dL    Albumin 3.7 2.9 - 4.4 g/dL    Alpha 1 Globulin 0.2 0.0 - 0.4 g/dL    Alpha 2 Globulin 0.9 0.4 - 1.0 g/dL    BETA GLOBULIN 1.0 0.7 - 1.3 g/dL    GAMMA GLOBULIN 1.0 0.4 - 1.8 g/dL    M-Rolan Not Observed Not Observed g/dL    Globulin 3.2 2.2 - 3.9 g/dL    A/G Ratio 1.2 0.7 - 1.7      IMMUNOFIXATION RESULT Comment     CBC with Auto Differential    Collection Time: 10/26/22  1:33 PM   Result Value Ref Range    WBC 10.7 4.3 - 11.1 K/uL    RBC 4.98 4.23 - 5.6 M/uL    Hemoglobin 14.4 13.6 - 17.2 g/dL    Hematocrit 42.3 %    MCV 84.9 82.0 - 102.0 FL    MCH 28.9 26.1 - 32.9 PG    MCHC 34.0 31.4 - 35.0 g/dL    RDW 14.6 11.9 - 14.6 %    Platelets 105 164 - 578 K/uL    MPV 9.5 9.4 - 12.3 FL    nRBC 0.00 0.0 - 0.2 K/uL    Seg Neutrophils 63 43 - 78 %    Lymphocytes 21 13 - 44 %    Monocytes 10 4.0 - 12.0 %    Eosinophils % 5 0.5 - 7.8 %    Basophils 1 0.0 - 2.0 %    Immature Granulocytes 0 0.0 - 5.0 %    Segs Absolute 6.8 1.7 - 8.2 K/UL    Absolute Lymph # 2.2 0.5 - 4.6 K/UL    Absolute Mono # 1.0 0.1 - 1.3 K/UL    Absolute Eos # 0.5 0.0 - 0.8 K/UL    Basophils Absolute 0.1 0.0 - 0.2 K/UL    Absolute Immature Granulocyte 0.0 0.0 - 0.5 K/UL    Differential Type AUTOMATED     Comprehensive Metabolic Panel    Collection Time: 10/26/22  1:33 PM   Result Value Ref Range    Sodium 135 133 - 143 mmol/L    Potassium 4.4 3.5 - 5.1 mmol/L    Chloride 105 101 - 110 mmol/L    CO2 23 21 - 32 mmol/L    Anion Gap 7 2 - 11 mmol/L    Glucose 339 (H) 65 - 100 mg/dL    BUN 41 (H) 8 - 23 MG/DL    Creatinine 2.10 (H) 0.8 - 1.5 MG/DL    Est, Glom Filt Rate 34 (L) >60 ml/min/1.73m2    Calcium 9.2 8.3 - 10.4 MG/DL    Total Bilirubin 0.3 0.2 - 1.1 MG/DL    ALT 61 12 - 65 U/L    AST 31 15 - 37 U/L    Alk Phosphatase 106 50 - 136 U/L    Total Protein 7.5 6.3 - 8.2 g/dL    Albumin 3.4 3.2 - 4.6 g/dL    Globulin 4.1 2.8 - 4.5 g/dL    Albumin/Globulin Ratio 0.8 0.4 - 1.6     Miscellaneous Sendout    Collection Time: 10/26/22  1:33 PM   Result Value Ref Range    Test Description: FISH FOR BCR/ABL     Reference Lab NEOGENOMICS     Results: COLLECT FOR [facility]    Miscellaneous Sendout    Collection Time: 10/26/22  1:33 PM   Result Value Ref Range    Test Description: MYELOID DISORDERS PROFILE     Reference Lab NEOGENOMICS     Results: COLLECT FOR [facility]    CBC with Auto Differential    Collection Time: 11/16/22  1:27 PM   Result Value Ref Range    WBC 10.3 4.3 - 11.1 K/uL    RBC 5.06 4.23 - 5.6 M/uL    Hemoglobin 14.4 13.6 - 17.2 g/dL    Hematocrit 43.9 %    MCV 86.8 82.0 - 102.0 FL    MCH 28.5 26.1 - 32.9 PG    MCHC 32.8 31.4 - 35.0 g/dL    RDW 14.4 11.9 - 14.6 %    Platelets 170 337 - 792 K/uL    MPV 9.4 9.4 - 12.3 FL    nRBC 0.00 0.0 - 0.2 K/uL    Differential Type AUTOMATED      Seg Neutrophils 61 43 - 78 %    Lymphocytes 22 13 - 44 %    Monocytes 9 4.0 - 12.0 %    Eosinophils % 5 0.5 - 7.8 %    Basophils 2 0.0 - 2.0 %    Immature Granulocytes 0 0.0 - 5.0 %    Segs Absolute 6.3 1.7 - 8.2 K/UL    Absolute Lymph # 2.3 0.5 - 4.6 K/UL    Absolute Mono # 0.9 0.1 - 1.3 K/UL    Absolute Eos # 0.5 0.0 - 0.8 K/UL    Basophils Absolute 0.2 0.0 - 0.2 K/UL Absolute Immature Granulocyte 0.0 0.0 - 0.5 K/UL   Comprehensive Metabolic Panel    Collection Time: 11/16/22  1:27 PM   Result Value Ref Range    Sodium 133 133 - 143 mmol/L    Potassium 4.8 3.5 - 5.1 mmol/L    Chloride 101 101 - 110 mmol/L    CO2 26 21 - 32 mmol/L    Anion Gap 6 2 - 11 mmol/L    Glucose 445 (H) 65 - 100 mg/dL    BUN 34 (H) 8 - 23 MG/DL    Creatinine 2.40 (H) 0.8 - 1.5 MG/DL    Est, Glom Filt Rate 29 (L) >60 ml/min/1.73m2    Calcium 8.9 8.3 - 10.4 MG/DL    Total Bilirubin 0.4 0.2 - 1.1 MG/DL    ALT 48 12 - 65 U/L    AST 24 15 - 37 U/L    Alk Phosphatase 116 50 - 136 U/L    Total Protein 7.4 6.3 - 8.2 g/dL    Albumin 3.6 3.2 - 4.6 g/dL    Globulin 3.8 2.8 - 4.5 g/dL    Albumin/Globulin Ratio 0.9 0.4 - 1.6     EKG 12 Lead    Collection Time: 11/17/22  1:07 PM   Result Value Ref Range    Ventricular Rate 70 BPM    Atrial Rate 70 BPM    P-R Interval 174 ms    QRS Duration 148 ms    Q-T Interval 412 ms    QTc Calculation (Bazett) 444 ms    P Axis 45 degrees    R Axis -46 degrees    T Axis 73 degrees    Diagnosis Normal sinus rhythm    CBC    Collection Time: 11/17/22  1:25 PM   Result Value Ref Range    WBC 10.7 4.3 - 11.1 K/uL    RBC 5.10 4.23 - 5.6 M/uL    Hemoglobin 14.6 13.6 - 17.2 g/dL    Hematocrit 44.2 41.1 - 50.3 %    MCV 86.7 82 - 102 FL    MCH 28.6 26.1 - 32.9 PG    MCHC 33.0 31.4 - 35.0 g/dL    RDW 13.9 11.9 - 14.6 %    Platelets 252 339 - 720 K/uL    MPV 9.6 9.4 - 12.3 FL    nRBC 0.00 0.0 - 0.2 K/uL   Comprehensive Metabolic Panel    Collection Time: 11/17/22  1:25 PM   Result Value Ref Range    Sodium 137 133 - 143 mmol/L    Potassium 5.7 (H) 3.5 - 5.1 mmol/L    Chloride 106 101 - 110 mmol/L    CO2 25 21 - 32 mmol/L    Anion Gap 6 2 - 11 mmol/L    Glucose 304 (H) 65 - 100 mg/dL    BUN 51 (H) 8 - 23 MG/DL    Creatinine 2.20 (H) 0.8 - 1.5 MG/DL    Est, Glom Filt Rate 32 (L) >60 ml/min/1.73m2    Calcium 9.5 8.3 - 10.4 MG/DL    Total Bilirubin 0.4 0.2 - 1.1 MG/DL    ALT 51 12 - 65 U/L    AST 47 (H) 15 - 37 U/L    Alk Phosphatase 130 50 - 136 U/L    Total Protein 7.1 6.3 - 8.2 g/dL    Albumin 1.9 (L) 3.2 - 4.6 g/dL    Globulin 5.2 (H) 2.8 - 4.5 g/dL    Albumin/Globulin Ratio 0.4 0.4 - 1.6     Troponin    Collection Time: 11/17/22  1:25 PM   Result Value Ref Range    Troponin, High Sensitivity 8.5 0 - 14 pg/mL   Lipase    Collection Time: 11/17/22  1:25 PM   Result Value Ref Range    Lipase 224 73 - 393 U/L   Magnesium    Collection Time: 11/17/22  1:25 PM   Result Value Ref Range    Magnesium 2.3 1.8 - 2.4 mg/dL   Troponin    Collection Time: 11/17/22  3:36 PM   Result Value Ref Range    Troponin, High Sensitivity 9.0 0 - 14 pg/mL     No results found for: CHOL, CHOLPOCT, CHOLX, CHLST, CHOLV, HDL, HDLPOC, HDLC, LDL, LDLC, VLDLC, VLDL, TGLX, TRIGL  EKG- NSR IVCD    ASSESSMENT and PLAN    79 yo LE WITH [place]- Left heart catheterization with possible angioplasty and alternative therapies discussed. Risks and benefits of the procedure including bleeding, arterial injury, infection, MI, stoke, death, emergent cabg, acute renal failure, contrast allergic reaction were discussed and questions answered. Thank you for allowing me to participate in this patient's care. Please call or contact me if there are any questions or concerns regarding the above.       Brynn Piper MD  11/17/22  4:58 PM

## 2022-11-17 NOTE — Clinical Note
Prepped: bilateral groin. Site was prepped. Prepped with: ChloraPrep. Patient was draped after wet prep solution dried.

## 2022-11-17 NOTE — ED PROVIDER NOTES
[employer] Emergency Department Provider Note                   PCP:                TRAECY Bruner NP               Age: 78 y.o. Sex: male       Carlos Linder is a 78 y.o. male who presents to the Emergency Department with chief complaint of    Chief Complaint   Patient presents with    Chest Pain      Patient states that he has a history of 8 cardiac stents with his last one being about 5 years ago. He states 2 to 3 weeks ago he started having midsternal chest pain. He describes as a dull sensation that radiates down his left arm. It has been moderate in intensity. It is associated with sweats and shortness of breath. He states that it has been resolving after a few minutes when he takes nitroglycerin. He has had 1-2 episodes a week. He states around 830 this morning he developed the same chest pain. He took nitroglycerin but the pain came back intermittently over the next few hours. EMS gave him another nitroglycerin and that has resolved the chest pain and currently he is on complaint is a headache. Patient does have a history of a left bundle branch block which she has the EKG with him. The history is provided by the patient. All other systems reviewed and are negative. Review of Systems   Constitutional:  Negative for chills, fatigue and fever. HENT:  Negative for congestion and sore throat. Eyes:  Negative for visual disturbance. Respiratory:  Positive for shortness of breath. Negative for cough. Cardiovascular:  Positive for chest pain. Negative for palpitations and leg swelling. Gastrointestinal:  Negative for abdominal pain, nausea and vomiting. Genitourinary:  Negative for flank pain. Musculoskeletal:  Negative for back pain and neck pain. Skin:  Negative for rash. Neurological:  Positive for headaches. Negative for dizziness. No past medical history on file. No past surgical history on file. No family history on file.      Social History     Socioeconomic History    Marital status:    Tobacco Use    Smoking status: Never    Smokeless tobacco: Never        Allergies: Sulfa antibiotics and Pramipexole    Previous Medications    ASPIRIN 81 MG EC TABLET    Take 81 mg by mouth daily    ATORVASTATIN (LIPITOR) 40 MG TABLET        CARVEDILOL (COREG) 3.125 MG TABLET    TAKE 1 TABLET BY MOUTH TWICE DAILY    CLOPIDOGREL (PLAVIX) 75 MG TABLET    TAKE 1 TABLET BY MOUTH DAILY    COENZYME Q10 (CO Q-10) 200 MG CAPS    Take 200 mg by mouth daily    GABAPENTIN (NEURONTIN) 600 MG TABLET    Take 600 mg by mouth 3 times daily. JARDIANCE 10 MG TABLET    TAKE 1 TABLET BY MOUTH DAILY    LISINOPRIL (PRINIVIL;ZESTRIL) 2.5 MG TABLET    TAKE 1 TABLET BY MOUTH NIGHTLY    MAGNESIUM OXIDE (MAG-200) 200 MG TABS    Take 400 mg by mouth    MELATONIN 3 MG TABS TABLET    Take 10 mg by mouth daily    MULTIPLE VITAMIN (MULTIVITAMIN ADULT PO)    Take by mouth    NITROGLYCERIN (NITRODUR) 0.1 MG/HR    Place 1 patch onto the skin daily    OMEGA-3 1000 MG CAPS    Take 1,000 mg by mouth    OMEPRAZOLE (PRILOSEC) 40 MG DELAYED RELEASE CAPSULE    TAKE 1 CAPSULE BY MOUTH DAILY ON AN EMPTY STOMACH    SERTRALINE (ZOLOFT) 100 MG TABLET    TAKE 2 TABLETS BY MOUTH ONCE DAILY    TRAZODONE (DESYREL) 100 MG TABLET            Vitals signs and nursing note reviewed. Patient Vitals for the past 4 hrs:   Pulse Resp BP SpO2   11/17/22 1525 66 14 (!) 154/79 94 %   11/17/22 1510 64 (!) 9 (!) 140/71 92 %   11/17/22 1455 64 18 (!) 148/74 94 %   11/17/22 1440 65 18 (!) 144/70 96 %   11/17/22 1425 68 15 138/76 91 %   11/17/22 1410 69 14 (!) 143/87 94 %   11/17/22 1355 71 19 139/67 93 %   11/17/22 1340 -- -- (!) 149/68 91 %          Physical Exam  Vitals and nursing note reviewed. Constitutional:       Appearance: Normal appearance. HENT:      Head: Normocephalic and atraumatic. Cardiovascular:      Rate and Rhythm: Normal rate and regular rhythm. Pulses: Normal pulses.       Heart sounds: Normal heart sounds. Pulmonary:      Effort: Pulmonary effort is normal.      Breath sounds: Normal breath sounds. Chest:      Chest wall: No tenderness. Abdominal:      General: Abdomen is flat. Bowel sounds are normal.      Palpations: Abdomen is soft. Tenderness: There is no abdominal tenderness. Musculoskeletal:         General: No tenderness. Cervical back: Normal range of motion and neck supple. No tenderness. Thoracic back: No tenderness. Right lower leg: No edema. Left lower leg: No edema. Skin:     General: Skin is warm and dry. Neurological:      General: No focal deficit present. Mental Status: He is alert and oriented to person, place, and time.         Orders Placed This Encounter   Procedures    XR CHEST PORTABLE    CBC    Comprehensive Metabolic Panel    Troponin    Lipase    Magnesium    Cardiac Monitor    Pulse Oximetry    Insert teixeira catheter    EKG 12 Lead    Saline lock IV       Procedures    ED EKG Interpretation  EKG was interpreted in the absence of a cardiologist.    Rate: Rate: Normal  EKG Interpretation: EKG Interpretation: sinus rhythm  ST Segments: Nonspecific ST segments - NO STEMI  Left bundle branch block is old compared to previous EKG    Results Include:    Recent Results (from the past 24 hour(s))   EKG 12 Lead    Collection Time: 11/17/22  1:07 PM   Result Value Ref Range    Ventricular Rate 70 BPM    Atrial Rate 70 BPM    P-R Interval 174 ms    QRS Duration 148 ms    Q-T Interval 412 ms    QTc Calculation (Bazett) 444 ms    P Axis 45 degrees    R Axis -46 degrees    T Axis 73 degrees    Diagnosis Normal sinus rhythm    CBC    Collection Time: 11/17/22  1:25 PM   Result Value Ref Range    WBC 10.7 4.3 - 11.1 K/uL    RBC 5.10 4.23 - 5.6 M/uL    Hemoglobin 14.6 13.6 - 17.2 g/dL    Hematocrit 44.2 41.1 - 50.3 %    MCV 86.7 82 - 102 FL    MCH 28.6 26.1 - 32.9 PG    MCHC 33.0 31.4 - 35.0 g/dL    RDW 13.9 11.9 - 14.6 %    Platelets 968 150 - 450 K/uL    MPV 9.6 9.4 - 12.3 FL    nRBC 0.00 0.0 - 0.2 K/uL   Comprehensive Metabolic Panel    Collection Time: 11/17/22  1:25 PM   Result Value Ref Range    Sodium 137 133 - 143 mmol/L    Potassium 5.7 (H) 3.5 - 5.1 mmol/L    Chloride 106 101 - 110 mmol/L    CO2 25 21 - 32 mmol/L    Anion Gap 6 2 - 11 mmol/L    Glucose 304 (H) 65 - 100 mg/dL    BUN 51 (H) 8 - 23 MG/DL    Creatinine 2.20 (H) 0.8 - 1.5 MG/DL    Est, Glom Filt Rate 32 (L) >60 ml/min/1.73m2    Calcium 9.5 8.3 - 10.4 MG/DL    Total Bilirubin 0.4 0.2 - 1.1 MG/DL    ALT 51 12 - 65 U/L    AST 47 (H) 15 - 37 U/L    Alk Phosphatase 130 50 - 136 U/L    Total Protein 7.1 6.3 - 8.2 g/dL    Albumin 1.9 (L) 3.2 - 4.6 g/dL    Globulin 5.2 (H) 2.8 - 4.5 g/dL    Albumin/Globulin Ratio 0.4 0.4 - 1.6     Troponin    Collection Time: 11/17/22  1:25 PM   Result Value Ref Range    Troponin, High Sensitivity 8.5 0 - 14 pg/mL   Lipase    Collection Time: 11/17/22  1:25 PM   Result Value Ref Range    Lipase 224 73 - 393 U/L   Magnesium    Collection Time: 11/17/22  1:25 PM   Result Value Ref Range    Magnesium 2.3 1.8 - 2.4 mg/dL   Troponin    Collection Time: 11/17/22  3:36 PM   Result Value Ref Range    Troponin, High Sensitivity 9.0 0 - 14 pg/mL          XR CHEST PORTABLE   Final Result   No radiographic evidence of acute cardiopulmonary disease. ED Course as of 11/17/22 1709   Thu Nov 17, 2022   1529 Patient states that his chest pain has returned and it was 4 out of 10 in intensity. His initial troponin was normal.  Repeat troponin is pending. Will give sublingual nitroglycerin. [CW]   [id number] Patient is resting comfortably in bed with stable vital signs on the monitor. He states he was having chest pain earlier but resolved before nitroglycerin was given by nursing. Patient currently has no chest pain. I updated patient on the results and plan.  [CW]      ED Course User Index  [CW] Liza Zimmerman MD       [facility]  Number of Diagnoses or Management Options  Unstable angina [facility])  Diagnosis management comments: Patient presents with chest pain which appears to be from unstable angina. Patient took 4 baby aspirin earlier. His chest pain was controlled with nitroglycerin from EMS. Patient had some chest pain in the ED that resolved before nitroglycerin was given. His EKG shows a left bundle branch block which there is no prior EKG in his records but he has an EKG with him. His left bundle branch block is old. His serial troponins were normal.  His chest x-ray showed no significant abnormalities. His BUN and creatinine and potassium are mildly elevated. His blood sugar was 304 with no evidence of DKA. I do not suspect PE, aortic dissection, pericardial disease, or any other acute cardiopulmonary process. I consulted with cardiologist who admitted patient. Explanation: The diagnosis and plan as well as the results of any testing and treatments today in the department were communicated to the patient and/or their family/caregiver (if present). The patient/caregiver verbalized understanding and realize that they are being admitted to the hospital for further evaluation and treatment. All questions were answered at bedside. ICD-10-CM    1. Unstable angina (HCC)  I20.0       2. Renal insufficiency  N28.9           DISPOSITION Decision To Admit 11/17/2022 04:35:26 PM       Voice dictation software was used during the making of this note. This software is not perfect and grammatical and other typographical errors may be present. This note has not been completely proofread for errors.      Morena Barnett MD  11/17/22 7454

## 2022-11-18 LAB
ANION GAP SERPL CALC-SCNC: 1 MMOL/L (ref 2–11)
BUN SERPL-MCNC: 33 MG/DL (ref 8–23)
CALCIUM SERPL-MCNC: 8.9 MG/DL (ref 8.3–10.4)
CHLORIDE SERPL-SCNC: 109 MMOL/L (ref 101–110)
CO2 SERPL-SCNC: 28 MMOL/L (ref 21–32)
CREAT SERPL-MCNC: 1.7 MG/DL (ref 0.8–1.5)
ERYTHROCYTE [DISTWIDTH] IN BLOOD BY AUTOMATED COUNT: 13.9 % (ref 11.9–14.6)
EST. AVERAGE GLUCOSE BLD GHB EST-MCNC: 177 MG/DL
GLUCOSE BLD STRIP.AUTO-MCNC: 140 MG/DL (ref 65–100)
GLUCOSE BLD STRIP.AUTO-MCNC: 191 MG/DL (ref 65–100)
GLUCOSE SERPL-MCNC: 188 MG/DL (ref 65–100)
HBA1C MFR BLD: 7.8 % (ref 4.8–5.6)
HCT VFR BLD AUTO: 42.9 % (ref 41.1–50.3)
HGB BLD-MCNC: 14.3 G/DL (ref 13.6–17.2)
MCH RBC QN AUTO: 28.9 PG (ref 26.1–32.9)
MCHC RBC AUTO-ENTMCNC: 33.3 G/DL (ref 31.4–35)
MCV RBC AUTO: 86.8 FL (ref 82–102)
NRBC # BLD: 0 K/UL (ref 0–0.2)
PLATELET # BLD AUTO: 173 K/UL (ref 150–450)
PMV BLD AUTO: 9.2 FL (ref 9.4–12.3)
POTASSIUM SERPL-SCNC: 4.5 MMOL/L (ref 3.5–5.1)
RBC # BLD AUTO: 4.94 M/UL (ref 4.23–5.6)
SERVICE CMNT-IMP: ABNORMAL
SERVICE CMNT-IMP: ABNORMAL
SODIUM SERPL-SCNC: 138 MMOL/L (ref 133–143)
WBC # BLD AUTO: 10.9 K/UL (ref 4.3–11.1)

## 2022-11-18 PROCEDURE — 99152 MOD SED SAME PHYS/QHP 5/>YRS: CPT | Performed by: INTERNAL MEDICINE

## 2022-11-18 PROCEDURE — 6360000004 HC RX CONTRAST MEDICATION: Performed by: INTERNAL MEDICINE

## 2022-11-18 PROCEDURE — C9600 PERC DRUG-EL COR STENT SING: HCPCS | Performed by: INTERNAL MEDICINE

## 2022-11-18 PROCEDURE — 2709999900 HC NON-CHARGEABLE SUPPLY: Performed by: INTERNAL MEDICINE

## 2022-11-18 PROCEDURE — 6370000000 HC RX 637 (ALT 250 FOR IP): Performed by: NURSE PRACTITIONER

## 2022-11-18 PROCEDURE — 80048 BASIC METABOLIC PNL TOTAL CA: CPT

## 2022-11-18 PROCEDURE — 93458 L HRT ARTERY/VENTRICLE ANGIO: CPT | Performed by: INTERNAL MEDICINE

## 2022-11-18 PROCEDURE — 82962 GLUCOSE BLOOD TEST: CPT

## 2022-11-18 PROCEDURE — 2580000003 HC RX 258: Performed by: INTERNAL MEDICINE

## 2022-11-18 PROCEDURE — C1769 GUIDE WIRE: HCPCS | Performed by: INTERNAL MEDICINE

## 2022-11-18 PROCEDURE — 6370000000 HC RX 637 (ALT 250 FOR IP): Performed by: INTERNAL MEDICINE

## 2022-11-18 PROCEDURE — 2580000003 HC RX 258: Performed by: NURSE PRACTITIONER

## 2022-11-18 PROCEDURE — C1760 CLOSURE DEV, VASC: HCPCS | Performed by: INTERNAL MEDICINE

## 2022-11-18 PROCEDURE — 83036 HEMOGLOBIN GLYCOSYLATED A1C: CPT

## 2022-11-18 PROCEDURE — C1887 CATHETER, GUIDING: HCPCS | Performed by: INTERNAL MEDICINE

## 2022-11-18 PROCEDURE — G0378 HOSPITAL OBSERVATION PER HR: HCPCS

## 2022-11-18 PROCEDURE — 92928 PRQ TCAT PLMT NTRAC ST 1 LES: CPT | Performed by: INTERNAL MEDICINE

## 2022-11-18 PROCEDURE — C1874 STENT, COATED/COV W/DEL SYS: HCPCS | Performed by: INTERNAL MEDICINE

## 2022-11-18 PROCEDURE — 99153 MOD SED SAME PHYS/QHP EA: CPT | Performed by: INTERNAL MEDICINE

## 2022-11-18 PROCEDURE — 2500000003 HC RX 250 WO HCPCS: Performed by: INTERNAL MEDICINE

## 2022-11-18 PROCEDURE — C1894 INTRO/SHEATH, NON-LASER: HCPCS | Performed by: INTERNAL MEDICINE

## 2022-11-18 PROCEDURE — C1725 CATH, TRANSLUMIN NON-LASER: HCPCS | Performed by: INTERNAL MEDICINE

## 2022-11-18 PROCEDURE — 6360000002 HC RX W HCPCS: Performed by: INTERNAL MEDICINE

## 2022-11-18 PROCEDURE — 85027 COMPLETE CBC AUTOMATED: CPT

## 2022-11-18 DEVICE — STENT RONYX25012UX RESOLUTE ONYX 2.50X12
Type: IMPLANTABLE DEVICE | Status: FUNCTIONAL
Brand: RESOLUTE ONYX™

## 2022-11-18 DEVICE — ANGIO-SEAL VIP VASCULAR CLOSURE DEVICE
Type: IMPLANTABLE DEVICE | Status: FUNCTIONAL
Brand: ANGIO-SEAL

## 2022-11-18 RX ORDER — MAGNESIUM HYDROXIDE/ALUMINUM HYDROXICE/SIMETHICONE 120; 1200; 1200 MG/30ML; MG/30ML; MG/30ML
SUSPENSION ORAL PRN
Status: DISCONTINUED | OUTPATIENT
Start: 2022-11-18 | End: 2022-11-18 | Stop reason: HOSPADM

## 2022-11-18 RX ORDER — BIVALIRUDIN 250 MG/5ML
INJECTION, POWDER, LYOPHILIZED, FOR SOLUTION INTRAVENOUS PRN
Status: DISCONTINUED | OUTPATIENT
Start: 2022-11-18 | End: 2022-11-18 | Stop reason: HOSPADM

## 2022-11-18 RX ORDER — HEPARIN SODIUM 200 [USP'U]/100ML
INJECTION, SOLUTION INTRAVENOUS CONTINUOUS PRN
Status: DISCONTINUED | OUTPATIENT
Start: 2022-11-18 | End: 2022-11-18 | Stop reason: HOSPADM

## 2022-11-18 RX ORDER — CLOPIDOGREL BISULFATE 75 MG/1
TABLET ORAL PRN
Status: DISCONTINUED | OUTPATIENT
Start: 2022-11-18 | End: 2022-11-18 | Stop reason: HOSPADM

## 2022-11-18 RX ORDER — LIDOCAINE HYDROCHLORIDE 10 MG/ML
INJECTION, SOLUTION INFILTRATION; PERINEURAL PRN
Status: DISCONTINUED | OUTPATIENT
Start: 2022-11-18 | End: 2022-11-18 | Stop reason: HOSPADM

## 2022-11-18 RX ORDER — MIDAZOLAM HYDROCHLORIDE 1 MG/ML
INJECTION INTRAMUSCULAR; INTRAVENOUS PRN
Status: DISCONTINUED | OUTPATIENT
Start: 2022-11-18 | End: 2022-11-18 | Stop reason: HOSPADM

## 2022-11-18 RX ADMIN — SODIUM CHLORIDE: 9 INJECTION, SOLUTION INTRAVENOUS at 01:41

## 2022-11-18 RX ADMIN — ACETAMINOPHEN 650 MG: 325 TABLET ORAL at 14:42

## 2022-11-18 RX ADMIN — CARVEDILOL 3.12 MG: 3.12 TABLET, FILM COATED ORAL at 21:29

## 2022-11-18 RX ADMIN — GABAPENTIN 600 MG: 300 CAPSULE ORAL at 21:30

## 2022-11-18 RX ADMIN — ATORVASTATIN CALCIUM 40 MG: 40 TABLET, FILM COATED ORAL at 21:29

## 2022-11-18 RX ADMIN — PANTOPRAZOLE SODIUM 40 MG: 40 TABLET, DELAYED RELEASE ORAL at 17:19

## 2022-11-18 RX ADMIN — ROPINIROLE HYDROCHLORIDE 2 MG: 2 TABLET, FILM COATED ORAL at 09:21

## 2022-11-18 RX ADMIN — CLOPIDOGREL BISULFATE 75 MG: 75 TABLET ORAL at 09:21

## 2022-11-18 RX ADMIN — GABAPENTIN 600 MG: 300 CAPSULE ORAL at 09:22

## 2022-11-18 RX ADMIN — GABAPENTIN 600 MG: 300 CAPSULE ORAL at 14:40

## 2022-11-18 RX ADMIN — EMPAGLIFLOZIN 10 MG: 10 TABLET, FILM COATED ORAL at 09:22

## 2022-11-18 RX ADMIN — PANTOPRAZOLE SODIUM 40 MG: 40 TABLET, DELAYED RELEASE ORAL at 09:22

## 2022-11-18 RX ADMIN — CARVEDILOL 3.12 MG: 3.12 TABLET, FILM COATED ORAL at 09:20

## 2022-11-18 RX ADMIN — TRAZODONE HYDROCHLORIDE 150 MG: 50 TABLET ORAL at 21:37

## 2022-11-18 RX ADMIN — SODIUM CHLORIDE, PRESERVATIVE FREE 10 ML: 5 INJECTION INTRAVENOUS at 21:30

## 2022-11-18 RX ADMIN — SERTRALINE 150 MG: 100 TABLET, FILM COATED ORAL at 09:20

## 2022-11-18 RX ADMIN — CETIRIZINE HYDROCHLORIDE 10 MG: 10 TABLET ORAL at 09:22

## 2022-11-18 RX ADMIN — ASPIRIN 81 MG: 81 TABLET ORAL at 09:22

## 2022-11-18 ASSESSMENT — PAIN SCALES - GENERAL
PAINLEVEL_OUTOF10: 0
PAINLEVEL_OUTOF10: 3
PAINLEVEL_OUTOF10: 0

## 2022-11-18 ASSESSMENT — PAIN DESCRIPTION - DESCRIPTORS: DESCRIPTORS: ACHING

## 2022-11-18 ASSESSMENT — PAIN - FUNCTIONAL ASSESSMENT
PAIN_FUNCTIONAL_ASSESSMENT: 0-10
PAIN_FUNCTIONAL_ASSESSMENT: NONE - DENIES PAIN
PAIN_FUNCTIONAL_ASSESSMENT: 0-10
PAIN_FUNCTIONAL_ASSESSMENT: PREVENTS OR INTERFERES SOME ACTIVE ACTIVITIES AND ADLS

## 2022-11-18 ASSESSMENT — PAIN DESCRIPTION - ORIENTATION: ORIENTATION: ANTERIOR

## 2022-11-18 ASSESSMENT — PAIN DESCRIPTION - LOCATION: LOCATION: HEAD

## 2022-11-18 NOTE — CARE COORDINATION
Pt admitted for chest pain. Lives with his spouse. PTA, indep with his ADLs. Drives. Denies DME need. On RA. Denies current home care services. PCP confirmed. Insurance verified. Able to afford home meds. No discharge needs identified currently but will remain available. 11/18/22 1431   Service Assessment   Patient Orientation Alert and Oriented   Cognition Alert   History Provided By Patient   Primary Caregiver Self   Support Systems Spouse/Significant Other;Children;Family Members;[employer];Friends/Neighbors   PCP Verified by CM Yes  Barron Singh)   Prior Functional Level Independent in ADLs/IADLs   Current Functional Level Independent in ADLs/IADLs   Can patient return to prior living arrangement Yes   Ability to make needs known: Good   Family able to assist with home care needs: Yes   Would you like for me to discuss the discharge plan with any other family members/significant others, and if so, who? Yes   Social/Functional History   Lives With Spouse   Type of Home House   ADL Assistance Independent   Ambulation Assistance Independent   Active  Yes   Mode of Transportation Car   Discharge Planning   Type of [address] Prior To Admission None   Potential Assistance Needed N/A   DME Ordered? No   Potential Assistance Purchasing Medications No   Type of Home Care Services None   Patient expects to be discharged to: [address] Discharge   Transition of Care Consult (CM Consult) Discharge [facility] Discharge None   [employer] Information Provided? No   Mode of Transport at Discharge Other (see comment)  (Family)   Confirm Follow Up Transport Family   Condition of Participation: Discharge Planning   The Patient and/or Patient Representative was provided with a Choice of Provider? Patient   The Patient and/Or Patient Representative agree with the Discharge Plan?  Yes   Freedom of Choice list was provided with basic dialogue that supports the patient's individualized plan of care/goals, treatment preferences, and shares the quality data associated with the providers?   Yes

## 2022-11-18 NOTE — ED NOTES
Report received from Ignacio Marcelino, [address] [place] [address] and care assumed at this time.       Rigo Drummond, [address] [place] [address]  11/18/22 3617

## 2022-11-18 NOTE — ED NOTES
Report given to [employer].  Care transferred at this time     Refugio Zhang RN  11/18/22 4293 [facility]

## 2022-11-18 NOTE — ED NOTES
Pt remains asleep in nad. Respirations regular, even and unlabored.       Kash Fenton RN  11/18/22 7029

## 2022-11-18 NOTE — ED NOTES
Pt sleeping in nad with respirations regular, even, and unlabored.       Darshan Ferrari RN  11/18/22 6996

## 2022-11-18 NOTE — PROGRESS NOTES
TRANSFER - OUT REPORT:    [facility] with Dr. Coelho [employer]  Access: Right Femoral  1 stent to LAD    Right femoral closed with 6 fr angioseal  No bleeding or hematoma site soft    MAR  2 mg versed  Angiomax gtt and bolus   300 mg Plavix  30 ml mylanta     Verbal report given to Lowry Olszewski on [facility]  being transferred to [address] [place] [address] for routine progression of patient care       Report consisted of patient's Situation, Background, Assessment and Recommendations(SBAR). Information from the following report(s) Nurse Handoff Report and MAR was reviewed with the receiving nurse. Opportunity for questions and clarification was provided.       Patient transported with:  Registered Nurse

## 2022-11-18 NOTE — PROGRESS NOTES
TRANSFER - OUT REPORT:    Verbal report given to Sonya Law RN on Angel Slice  being transferred to 3rd floor-[facility] for routine post-op       Report consisted of patient's Situation, Background, Assessment and   Recommendations(SBAR). Information from the following report(s) Nurse Handoff Report was reviewed with the receiving nurse. [place] Assessment: No data recorded  Lines:   Peripheral IV 11/17/22 Right Antecubital (Active)   Site Assessment Clean, dry & intact 11/17/22 1355   Line Status Blood return noted;Normal saline locked;Specimen collected 11/17/22 1355   Phlebitis Assessment No symptoms 11/17/22 1355   Infiltration Assessment 0 11/17/22 1355        Opportunity for questions and clarification was provided.       Patient transported with:  Monitor and Registered Nurse

## 2022-11-19 VITALS
OXYGEN SATURATION: 97 % | HEART RATE: 75 BPM | SYSTOLIC BLOOD PRESSURE: 144 MMHG | HEIGHT: 70 IN | BODY MASS INDEX: 33.57 KG/M2 | DIASTOLIC BLOOD PRESSURE: 76 MMHG | WEIGHT: 234.5 LBS | TEMPERATURE: 97.5 F | RESPIRATION RATE: 20 BRPM

## 2022-11-19 LAB
ANION GAP SERPL CALC-SCNC: 5 MMOL/L (ref 2–11)
BUN SERPL-MCNC: 26 MG/DL (ref 8–23)
CALCIUM SERPL-MCNC: 10 MG/DL (ref 8.3–10.4)
CHLORIDE SERPL-SCNC: 105 MMOL/L (ref 101–110)
CO2 SERPL-SCNC: 26 MMOL/L (ref 21–32)
CREAT SERPL-MCNC: 1.8 MG/DL (ref 0.8–1.5)
ERYTHROCYTE [DISTWIDTH] IN BLOOD BY AUTOMATED COUNT: 13.8 % (ref 11.9–14.6)
GLUCOSE BLD STRIP.AUTO-MCNC: 220 MG/DL (ref 65–100)
GLUCOSE SERPL-MCNC: 165 MG/DL (ref 65–100)
HCT VFR BLD AUTO: 44.7 % (ref 41.1–50.3)
HGB BLD-MCNC: 14.8 G/DL (ref 13.6–17.2)
MCH RBC QN AUTO: 28.7 PG (ref 26.1–32.9)
MCHC RBC AUTO-ENTMCNC: 33.1 G/DL (ref 31.4–35)
MCV RBC AUTO: 86.8 FL (ref 82–102)
NRBC # BLD: 0 K/UL (ref 0–0.2)
PLATELET # BLD AUTO: 176 K/UL (ref 150–450)
PMV BLD AUTO: 9 FL (ref 9.4–12.3)
POTASSIUM SERPL-SCNC: 4.4 MMOL/L (ref 3.5–5.1)
RBC # BLD AUTO: 5.15 M/UL (ref 4.23–5.6)
SERVICE CMNT-IMP: ABNORMAL
SODIUM SERPL-SCNC: 136 MMOL/L (ref 133–143)
WBC # BLD AUTO: 15.3 K/UL (ref 4.3–11.1)

## 2022-11-19 PROCEDURE — 80048 BASIC METABOLIC PNL TOTAL CA: CPT

## 2022-11-19 PROCEDURE — 6370000000 HC RX 637 (ALT 250 FOR IP): Performed by: NURSE PRACTITIONER

## 2022-11-19 PROCEDURE — 99217 PR OBSERVATION CARE DISCHARGE MANAGEMENT: CPT | Performed by: INTERNAL MEDICINE

## 2022-11-19 PROCEDURE — G0378 HOSPITAL OBSERVATION PER HR: HCPCS

## 2022-11-19 PROCEDURE — 36415 COLL VENOUS BLD VENIPUNCTURE: CPT

## 2022-11-19 PROCEDURE — 2580000003 HC RX 258: Performed by: NURSE PRACTITIONER

## 2022-11-19 PROCEDURE — 85027 COMPLETE CBC AUTOMATED: CPT

## 2022-11-19 PROCEDURE — 82962 GLUCOSE BLOOD TEST: CPT

## 2022-11-19 RX ORDER — CARVEDILOL 3.12 MG/1
3.12 TABLET ORAL ONCE
Status: COMPLETED | OUTPATIENT
Start: 2022-11-19 | End: 2022-11-19

## 2022-11-19 RX ORDER — CARVEDILOL 6.25 MG/1
6.25 TABLET ORAL 2 TIMES DAILY
Status: DISCONTINUED | OUTPATIENT
Start: 2022-11-19 | End: 2022-11-19 | Stop reason: HOSPADM

## 2022-11-19 RX ORDER — CARVEDILOL 6.25 MG/1
6.25 TABLET ORAL 2 TIMES DAILY
Qty: 60 TABLET | Refills: 11 | Status: SHIPPED | OUTPATIENT
Start: 2022-11-19

## 2022-11-19 RX ADMIN — ASPIRIN 81 MG: 81 TABLET ORAL at 08:30

## 2022-11-19 RX ADMIN — INSULIN LISPRO 2 UNITS: 100 INJECTION, SOLUTION INTRAVENOUS; SUBCUTANEOUS at 08:52

## 2022-11-19 RX ADMIN — PANTOPRAZOLE SODIUM 40 MG: 40 TABLET, DELAYED RELEASE ORAL at 05:54

## 2022-11-19 RX ADMIN — GABAPENTIN 600 MG: 300 CAPSULE ORAL at 08:29

## 2022-11-19 RX ADMIN — EMPAGLIFLOZIN 10 MG: 10 TABLET, FILM COATED ORAL at 08:30

## 2022-11-19 RX ADMIN — CARVEDILOL 3.12 MG: 3.12 TABLET, FILM COATED ORAL at 08:30

## 2022-11-19 RX ADMIN — CLOPIDOGREL BISULFATE 75 MG: 75 TABLET ORAL at 08:29

## 2022-11-19 RX ADMIN — CETIRIZINE HYDROCHLORIDE 10 MG: 10 TABLET ORAL at 08:29

## 2022-11-19 RX ADMIN — SODIUM CHLORIDE, PRESERVATIVE FREE 10 ML: 5 INJECTION INTRAVENOUS at 08:31

## 2022-11-19 RX ADMIN — SERTRALINE 150 MG: 100 TABLET, FILM COATED ORAL at 08:30

## 2022-11-19 RX ADMIN — CARVEDILOL 3.12 MG: 3.12 TABLET, FILM COATED ORAL at 08:53

## 2022-11-19 RX ADMIN — ROPINIROLE HYDROCHLORIDE 2 MG: 2 TABLET, FILM COATED ORAL at 08:30

## 2022-11-19 ASSESSMENT — PAIN SCALES - GENERAL
PAINLEVEL_OUTOF10: 0

## 2022-11-19 NOTE — CARE COORDINATION
Pt is for discharge home today with family and no needs/supportive care orders recieved for CM at this time. 11/19/22 1635   Social/Functional History   Lives With Spouse   Type of Home House   ADL Assistance Independent   Ambulation Assistance Independent   Active  Yes   Mode of Transportation [address] Discharge   Transition of Care Consult ([address]) [address] Discharge None   [employer] Information Provided? No   Mode of Transport at Discharge Other (see comment)  (family)   Confirm Follow Up Transport Family   Condition of Participation: Discharge Planning   The Plan for Transition of Care is related to the following treatment goals: Pt will return home with supportive family   The Patient and/or Patient Representative was provided with a Choice of Provider? Patient   The Patient and/Or Patient Representative agree with the Discharge Plan?  Yes

## 2022-11-19 NOTE — CARE COORDINATION
Pt is for discharge home today with family and no needs/supportive care orders recieved for CM at this time. 11/19/22 0955   Social/Functional History   Lives With Spouse   Type of Home House   ADL Assistance Independent   Ambulation Assistance Independent   Active  Yes   Mode of Transportation Car   Services At/After Discharge   Transition of Care Consult (CM Consult) [address] Discharge None   [facility] Information Provided? No   Mode of Transport at Discharge Other (see comment)  (family)   Confirm Follow Up Transport Family   Condition of Participation: Discharge Planning   The Plan for Transition of Care is related to the following treatment goals: Pt will return home with supportive family   The Patient and/or Patient Representative was provided with a Choice of Provider? Patient   The Patient and/Or Patient Representative agree with the Discharge Plan? Yes   Freedom of Choice list was provided with basic dialogue that supports the patient's individualized plan of care/goals, treatment preferences, and shares the quality data associated with the providers?   Yes

## 2022-11-19 NOTE — DISCHARGE INSTRUCTIONS
[facility] Cardiology office has been informed you need a follow up appointment after discharge. You will be called on Monday with the follow up appointment. If you have NOT heard from our office by Tuesday, please contact our office for appointment at [phone]. No lifting of 10 lbs or more for 7 days, no tub baths for a week, may shower, no creams, lotions powders, or ointments to site for a week.

## 2022-11-19 NOTE — DISCHARGE SUMMARY
[facility] Cardiology Discharge Summary     Patient ID:  Scooby Hopkins  [id number]  89 y.o.  1955    Admit date: 11/17/2022    Discharge date:  11/19/2022    Admitting Physician: Christophe Mcfarland MD     Discharge Physician:  [facility]    Admission Diagnoses: Unstable angina ([address] [I20.0]  Chest pain [R07.9]  Renal insufficiency [N28.9]    Discharge Diagnoses:   Patient Active Problem List    Diagnosis    Chest pain    Diabetic peripheral neuropathy     Gastroesophageal reflux disease without esophagitis    Primary insomnia    Mixed hyperlipidemia    Obstructive sleep apnea syndrome    Benign essential hypertension    Stage 3b chronic kidney disease     Coronary arteriosclerosis    Gout     Hypertensive renal disease    Peripheral vascular disease     Type II diabetes mellitus, well controlled     Secondary hyperparathyroidism        Cardiology Procedures:  Left heart catheterization with PCI  Consults: none    Hospital Course:   Patient presented to the ED with c/o CP. Pt was subsequently scheduled for a LHC at [facility] on 11/18/22. Patient underwent cardiac catheterization by Dr. Sylvia Stanley. Patient was found to have a 80% stenosis of the ostial LAD that was stented with a 2.5 x 12mm Georges SACHIN with 0% residual stenosis. Patient tolerated the procedure well and was taken to the Telemetry floor for recovery. The following morning patient was up feeling well without any complaints of chest pain or shortness of breath. Patient's RFA cath site was clean, dry and intact without hematoma or bruit. Patient's labs were stable. Patient was seen and examined by  [facility] and determined stable and ready for discharge. Patient was instructed on the importance of medication compliance including taking Aspirin and Plavix everyday without missing a dose. After receiving drug eluting stents, the patient will remain on dual anti-platelet therapy for at least 1 year.  For maximized medical therapy for CAD, patient will continue BB and statin, no ACEi/ARB due to renal function. The patient will follow up with [facility] and has been referred to cardiac rehab. [facility] Cardiology office has been informed you need a follow up appointment after discharge. You will be called on Monday with the follow up appointment. If you have NOT heard from our office by Tuesday, please contact our office for appointment at [phone]. DISPOSITION: The patient is being discharged home in stable condition on a low saturated fat, low cholesterol and low salt diet. The patient is instructed to advance activities as tolerated to the limit of fatigue or shortness of breath. The patient is instructed to avoid all heavy lifting, straining, stooping or squatting for 3-5 days. The patient is instructed to watch the cath site for bleeding/oozing; if seen, the patient is instructed to apply firm pressure with a clean cloth and call [facility]iology at [phone]. The patient is instructed to watch for signs of infection which include: increasing area of redness, fever/hot to touch or purulent drainage at the catheterization site. The patient is instructed not to soak in a bathtub for 7-10 days, but is cleared to shower. The patient is instructed to call the office or return to the ER for immediate evaluation for any shortness of breath or chest pain not relieved by NTG. Discharge Exam: /76   Pulse 72   Temp 97.6 °F (36.4 °C) (Oral)   Resp 18   Ht 5' 10\" (1.778 m)   Wt 240 lb 4.8 oz (109 kg)   SpO2 95%   BMI 34.48 kg/m²       Patient has been seen by Dr. Brito Matters: see his progress note for exam details.     Recent Results (from the past 24 hour(s))   POCT Glucose    Collection Time: 11/18/22  1:40 AM   Result Value Ref Range    POC Glucose 140 (H) 65 - 100 mg/dL    Performed by: Marry    Basic Metabolic Panel w/ Reflex to MG    Collection Time: 11/18/22  8:48 AM   Result Value Ref Range    Sodium 138 133 - 143 mmol/L    Potassium 4.5 3.5 - 5.1 mmol/L    Chloride 109 101 - 110 mmol/L    CO2 28 21 - 32 mmol/L    Anion Gap 1 (L) 2 - 11 mmol/L    Glucose 188 (H) 65 - 100 mg/dL    BUN 33 (H) 8 - 23 MG/DL    Creatinine 1.70 (H) 0.8 - 1.5 MG/DL    Est, Glom Filt Rate 44 (L) >60 ml/min/1.73m2    Calcium 8.9 8.3 - 10.4 MG/DL   Hemoglobin A1c    Collection Time: 11/18/22  8:48 AM   Result Value Ref Range    Hemoglobin A1C 7.8 (H) 4.8 - 5.6 %    eAG 177 mg/dL   CBC    Collection Time: 11/18/22  8:48 AM   Result Value Ref Range    WBC 10.9 4.3 - 11.1 K/uL    RBC 4.94 4.23 - 5.6 M/uL    Hemoglobin 14.3 13.6 - 17.2 g/dL    Hematocrit 42.9 41.1 - 50.3 %    MCV 86.8 82 - 102 FL    MCH 28.9 26.1 - 32.9 PG    MCHC 33.3 31.4 - 35.0 g/dL    RDW 13.9 11.9 - 14.6 %    Platelets 896 102 - 874 K/uL    MPV 9.2 (L) 9.4 - 12.3 FL    nRBC 0.00 0.0 - 0.2 K/uL   POCT Glucose    Collection Time: 11/18/22  9:20 AM   Result Value Ref Range    POC Glucose 191 (H) 65 - 100 mg/dL    Performed by: Clayton          Patient Instructions:   Current Discharge Medication List        CONTINUE these medications which have CHANGED    Details   carvedilol (COREG) 6.25 MG tablet Take 1 tablet by mouth 2 times daily  Qty: 60 tablet, Refills: 11           CONTINUE these medications which have NOT CHANGED    Details   cetirizine (ZYRTEC) 10 MG tablet Take 10 mg by mouth daily      nitroGLYCERIN (NITROSTAT) 0.4 MG SL tablet Place 0.4 mg under the tongue every 5 minutes as needed      rOPINIRole (REQUIP) 2 MG tablet Take 2 mg by mouth daily      atorvastatin (LIPITOR) 40 MG tablet       clopidogrel (PLAVIX) 75 MG tablet TAKE 1 TABLET BY MOUTH DAILY      JARDIANCE 10 MG tablet TAKE 1 TABLET BY MOUTH DAILY      omeprazole (PRILOSEC) 40 MG delayed release capsule TAKE 1 CAPSULE BY MOUTH DAILY ON AN EMPTY STOMACH      traZODone (DESYREL) 100 MG tablet       sertraline (ZOLOFT) 100 MG tablet 100 mg daily      aspirin 81 MG EC tablet Take 81 mg by mouth daily      Coenzyme Q10 (CO Q-10) 200 MG CAPS Take 200 mg by mouth daily      gabapentin (NEURONTIN) 600 MG tablet Take 600 mg by mouth 3 times daily. Magnesium Oxide 200 MG TABS Take 400 mg by mouth      melatonin 3 MG TABS tablet Take 10 mg by mouth daily      Multiple Vitamin (MULTIVITAMIN ADULT PO) Take by mouth      Omega-3 1000 MG CAPS Take 1,000 mg by mouth           STOP taking these medications       metFORMIN (GLUCOPHAGE) 850 MG tablet Comments:   Reason for Stopping:         lisinopril (PRINIVIL;ZESTRIL) 2.5 MG tablet Comments:   Reason for Stopping:         nitroGLYCERIN (NITRODUR) 0.1 MG/HR Comments:   Reason for Stopping:            Patient seen and examined by me. Agree with above note by physician extender.   Key findings are:  No CP or MOTA  CV- RRR without murmur  Lungs- Clear bilaterally  Ext- no edema    Plan: CAD- stable-home with import of DAPT stressed

## 2022-11-19 NOTE — PROGRESS NOTES
TRANSFER - IN REPORT:    Verbal report received from TONI Shine on Glenys Wilson being received from [facility] for routine progression of care. Report consisted of patients Situation, Background, Assessment and Recommendations(SBAR). Information from the following report(s) SBAR, Kardex, Procedure Summary, Intake/Output, MAR, Recent Results, and Cardiac Rhythm NSR  was reviewed. Opportunity for questions and clarification was provided. Assessment completed upon patients arrival to unit and care assumed. Patient received to room 310. Patient connected to monitor and assessment completed. Plan of care reviewed. Patient oriented to room and call light. Patient aware to use call light to communicate any chest pain or needs. Admission skin assessment completed with Sachi gates RN and reveals the following: Heels and sacrum are clean, dry, intact, and without redness. Rt groin site s/p LHC is clean, dry, intact, without bleeding or hematoma. Pt AO x4 with wife at bedside.

## 2022-11-21 ENCOUNTER — TELEPHONE (OUTPATIENT)
Dept: CARDIOLOGY CLINIC | Age: 67
End: 2022-11-21

## 2022-11-21 NOTE — TELEPHONE ENCOUNTER
----- Message from Elder Bustos sent at 11/21/2022  9:39 AM EST -----  Regarding: FW: follow up    ----- Message -----  From: TRACEY Puckett CNP  Sent: 11/19/2022   8:51 AM EST  To: , #  Subject: follow up                                        Pt needs follow up after PCI in 2-4 weeks. He will be new to the practice.     Thanks

## (undated) DEVICE — GUIDEWIRE VASC L150CM DIA0.035IN FLX END L7CM J 3MM PTFE

## (undated) DEVICE — CATHETER DIAG 6FR L110CM PIG CRV SZ 6 SIDE H DBL BRAID WIRE

## (undated) DEVICE — CATHETER ANGIO JL4 0.038 IN AD 6 FRX100 CM STD SUPER TORQUE

## (undated) DEVICE — CATHETER DIAG AD 6FR L100CM 0.038IN STD COR POLYUR JUDKINS

## (undated) DEVICE — CATH BLLN ANGIO 2.50X12MM NC EUPHORIA RX

## (undated) DEVICE — GUIDE CATHETER: Brand: RUNWAY™

## (undated) DEVICE — INTRODUCER SHTH 5FR CANN L11CM GWIRE 0.038IN STD KINK

## (undated) DEVICE — INTRODUCER SHTH 6FR L11CM 0.038IN STD SIDEPRT EXTN 3 W

## (undated) DEVICE — COPILOT BLEEDBACK CONTROL VALVE: Brand: COPILOT

## (undated) DEVICE — Device: Brand: PROWATER

## (undated) DEVICE — GUIDEWIRE WITH ICE™ HYDROPHILIC COATING: Brand: CHOICE™